# Patient Record
Sex: MALE | URBAN - METROPOLITAN AREA
[De-identification: names, ages, dates, MRNs, and addresses within clinical notes are randomized per-mention and may not be internally consistent; named-entity substitution may affect disease eponyms.]

---

## 2023-06-23 ENCOUNTER — TELEPHONE (OUTPATIENT)
Dept: TRANSPLANT | Facility: CLINIC | Age: 48
End: 2023-06-23

## 2023-06-29 ENCOUNTER — TELEPHONE (OUTPATIENT)
Dept: TRANSPLANT | Facility: CLINIC | Age: 48
End: 2023-06-29
Payer: MEDICARE

## 2023-06-30 ENCOUNTER — TELEPHONE (OUTPATIENT)
Dept: TRANSPLANT | Facility: CLINIC | Age: 48
End: 2023-06-30
Payer: MEDICARE

## 2023-07-12 ENCOUNTER — TELEPHONE (OUTPATIENT)
Dept: TRANSPLANT | Facility: CLINIC | Age: 48
End: 2023-07-12
Payer: MEDICARE

## 2025-01-12 ENCOUNTER — HOSPITAL ENCOUNTER (INPATIENT)
Facility: HOSPITAL | Age: 50
LOS: 7 days | Discharge: HOME | DRG: 542 | End: 2025-01-19
Attending: STUDENT IN AN ORGANIZED HEALTH CARE EDUCATION/TRAINING PROGRAM | Admitting: STUDENT IN AN ORGANIZED HEALTH CARE EDUCATION/TRAINING PROGRAM
Payer: MEDICARE

## 2025-01-12 DIAGNOSIS — A41.9 SEPSIS: ICD-10-CM

## 2025-01-12 DIAGNOSIS — R07.9 CHEST PAIN: ICD-10-CM

## 2025-01-12 DIAGNOSIS — M79.605 LEFT LEG PAIN: Primary | ICD-10-CM

## 2025-01-12 DIAGNOSIS — R00.0 TACHYCARDIA: ICD-10-CM

## 2025-01-12 DIAGNOSIS — R94.31 ABNORMAL EKG: ICD-10-CM

## 2025-01-12 PROCEDURE — 21400001 HC TELEMETRY ROOM

## 2025-01-12 PROCEDURE — 11000001 HC ACUTE MED/SURG PRIVATE ROOM

## 2025-01-12 RX ORDER — IBUPROFEN 200 MG
24 TABLET ORAL
Status: DISCONTINUED | OUTPATIENT
Start: 2025-01-12 | End: 2025-01-19 | Stop reason: HOSPADM

## 2025-01-12 RX ORDER — NIFEDIPINE 90 MG/1
90 TABLET, EXTENDED RELEASE ORAL DAILY
COMMUNITY

## 2025-01-12 RX ORDER — IBUPROFEN 200 MG
16 TABLET ORAL
Status: DISCONTINUED | OUTPATIENT
Start: 2025-01-12 | End: 2025-01-19 | Stop reason: HOSPADM

## 2025-01-12 RX ORDER — ONDANSETRON HYDROCHLORIDE 2 MG/ML
4 INJECTION, SOLUTION INTRAVENOUS EVERY 4 HOURS PRN
Status: DISCONTINUED | OUTPATIENT
Start: 2025-01-12 | End: 2025-01-19 | Stop reason: HOSPADM

## 2025-01-12 RX ORDER — ALUMINUM HYDROXIDE, MAGNESIUM HYDROXIDE, AND SIMETHICONE 1200; 120; 1200 MG/30ML; MG/30ML; MG/30ML
30 SUSPENSION ORAL 4 TIMES DAILY PRN
Status: DISCONTINUED | OUTPATIENT
Start: 2025-01-12 | End: 2025-01-19 | Stop reason: HOSPADM

## 2025-01-12 RX ORDER — TALC
6 POWDER (GRAM) TOPICAL NIGHTLY PRN
Status: DISCONTINUED | OUTPATIENT
Start: 2025-01-12 | End: 2025-01-19 | Stop reason: HOSPADM

## 2025-01-12 RX ORDER — BISACODYL 10 MG/1
10 SUPPOSITORY RECTAL DAILY PRN
Status: DISCONTINUED | OUTPATIENT
Start: 2025-01-12 | End: 2025-01-19 | Stop reason: HOSPADM

## 2025-01-12 RX ORDER — POLYETHYLENE GLYCOL 3350 17 G/17G
17 POWDER, FOR SOLUTION ORAL 2 TIMES DAILY PRN
Status: DISCONTINUED | OUTPATIENT
Start: 2025-01-12 | End: 2025-01-19 | Stop reason: HOSPADM

## 2025-01-12 RX ORDER — SODIUM CHLORIDE 0.9 % (FLUSH) 0.9 %
10 SYRINGE (ML) INJECTION
Status: DISCONTINUED | OUTPATIENT
Start: 2025-01-12 | End: 2025-01-19 | Stop reason: HOSPADM

## 2025-01-12 RX ORDER — GLUCAGON 1 MG
1 KIT INJECTION
Status: DISCONTINUED | OUTPATIENT
Start: 2025-01-12 | End: 2025-01-19 | Stop reason: HOSPADM

## 2025-01-12 RX ORDER — ACETAMINOPHEN 500 MG
1000 TABLET ORAL EVERY 6 HOURS PRN
Status: DISCONTINUED | OUTPATIENT
Start: 2025-01-12 | End: 2025-01-19 | Stop reason: HOSPADM

## 2025-01-12 RX ORDER — ASPIRIN 81 MG/1
81 TABLET ORAL DAILY
COMMUNITY

## 2025-01-12 RX ORDER — MUPIROCIN 20 MG/G
OINTMENT TOPICAL 2 TIMES DAILY
Status: DISPENSED | OUTPATIENT
Start: 2025-01-13 | End: 2025-01-17

## 2025-01-13 PROBLEM — M79.605 LEFT LEG PAIN: Status: ACTIVE | Noted: 2025-01-13

## 2025-01-13 PROBLEM — A41.9 SEPSIS: Status: ACTIVE | Noted: 2025-01-13

## 2025-01-13 LAB
ABS NEUT (OLG): 42.66 X10(3)/MCL (ref 2.1–9.2)
ALBUMIN SERPL-MCNC: 2.2 G/DL (ref 3.5–5)
ALBUMIN/GLOB SERPL: 0.5 RATIO (ref 1.1–2)
ALP SERPL-CCNC: 88 UNIT/L (ref 40–150)
ALT SERPL-CCNC: 15 UNIT/L (ref 0–55)
ANION GAP SERPL CALC-SCNC: 13 MEQ/L
AST SERPL-CCNC: 15 UNIT/L (ref 5–34)
BACTERIA #/AREA URNS AUTO: ABNORMAL /HPF
BILIRUB SERPL-MCNC: 0.4 MG/DL
BILIRUB UR QL STRIP.AUTO: NEGATIVE
BUN SERPL-MCNC: 59.3 MG/DL (ref 8.9–20.6)
CALCIUM SERPL-MCNC: 10.2 MG/DL (ref 8.4–10.2)
CHLORIDE SERPL-SCNC: 103 MMOL/L (ref 98–107)
CLARITY UR: CLEAR
CO2 SERPL-SCNC: 22 MMOL/L (ref 22–29)
COLOR UR AUTO: ABNORMAL
CREAT SERPL-MCNC: 6.92 MG/DL (ref 0.72–1.25)
CREAT/UREA NIT SERPL: 9
ERYTHROCYTE [DISTWIDTH] IN BLOOD BY AUTOMATED COUNT: 12.6 % (ref 11.5–17)
FERRITIN SERPL-MCNC: 5254.22 NG/ML (ref 21.81–274.66)
FLUAV AG UPPER RESP QL IA.RAPID: NOT DETECTED
FLUBV AG UPPER RESP QL IA.RAPID: NOT DETECTED
GFR SERPLBLD CREATININE-BSD FMLA CKD-EPI: 9 ML/MIN/1.73/M2
GLOBULIN SER-MCNC: 4.2 GM/DL (ref 2.4–3.5)
GLUCOSE SERPL-MCNC: 115 MG/DL (ref 74–100)
GLUCOSE UR QL STRIP: NORMAL
HBV SURFACE AG SERPL QL IA: NONREACTIVE
HCT VFR BLD AUTO: 25.2 % (ref 42–52)
HGB BLD-MCNC: 8.4 G/DL (ref 14–18)
HGB UR QL STRIP: ABNORMAL
INSTRUMENT WBC (OLG): 46.88 X10(3)/MCL
IRON SATN MFR SERPL: 11 % (ref 20–50)
IRON SERPL-MCNC: 11 UG/DL (ref 65–175)
KETONES UR QL STRIP: NEGATIVE
LEUKOCYTE ESTERASE UR QL STRIP: NEGATIVE
LYMPHOCYTES NFR BLD MANUAL: 0.94 X10(3)/MCL
LYMPHOCYTES NFR BLD MANUAL: 2 %
MACROCYTES BLD QL SMEAR: ABNORMAL
MAGNESIUM SERPL-MCNC: 1.9 MG/DL (ref 1.6–2.6)
MCH RBC QN AUTO: 33.9 PG (ref 27–31)
MCHC RBC AUTO-ENTMCNC: 33.3 G/DL (ref 33–36)
MCV RBC AUTO: 101.6 FL (ref 80–94)
MONOCYTES NFR BLD MANUAL: 3.28 X10(3)/MCL (ref 0.1–1.3)
MONOCYTES NFR BLD MANUAL: 7 %
NEUTROPHILS NFR BLD MANUAL: 91 %
NITRITE UR QL STRIP: NEGATIVE
NRBC BLD AUTO-RTO: 0 %
OHS QRS DURATION: 98 MS
OHS QTC CALCULATION: 449 MS
PH UR STRIP: 6.5 [PH]
PLATELET # BLD AUTO: 362 X10(3)/MCL (ref 130–400)
PLATELET # BLD EST: NORMAL 10*3/UL
PMV BLD AUTO: 9.7 FL (ref 7.4–10.4)
POIKILOCYTOSIS BLD QL SMEAR: ABNORMAL
POTASSIUM SERPL-SCNC: 3.8 MMOL/L (ref 3.5–5.1)
PROT SERPL-MCNC: 6.4 GM/DL (ref 6.4–8.3)
PROT UR QL STRIP: ABNORMAL
RBC # BLD AUTO: 2.48 X10(6)/MCL (ref 4.7–6.1)
RBC #/AREA URNS AUTO: ABNORMAL /HPF
RBC MORPH BLD: ABNORMAL
SARS-COV-2 RNA RESP QL NAA+PROBE: NOT DETECTED
SODIUM SERPL-SCNC: 138 MMOL/L (ref 136–145)
SP GR UR STRIP.AUTO: 1.01 (ref 1–1.03)
SQUAMOUS #/AREA URNS LPF: ABNORMAL /HPF
TIBC SERPL-MCNC: 86 UG/DL (ref 60–240)
TIBC SERPL-MCNC: 97 UG/DL (ref 250–450)
TRANSFERRIN SERPL-MCNC: 86 MG/DL (ref 174–364)
TROPONIN I SERPL-MCNC: 0.43 NG/ML (ref 0–0.04)
TROPONIN I SERPL-MCNC: 0.65 NG/ML (ref 0–0.04)
TROPONIN I SERPL-MCNC: 0.67 NG/ML (ref 0–0.04)
UROBILINOGEN UR STRIP-ACNC: 2
VANCOMYCIN SERPL-MCNC: 12.2 UG/ML (ref 15–20)
WBC # BLD AUTO: 46.88 X10(3)/MCL (ref 4.5–11.5)
WBC #/AREA URNS AUTO: ABNORMAL /HPF

## 2025-01-13 PROCEDURE — 84484 ASSAY OF TROPONIN QUANT: CPT

## 2025-01-13 PROCEDURE — 21400001 HC TELEMETRY ROOM

## 2025-01-13 PROCEDURE — 85007 BL SMEAR W/DIFF WBC COUNT: CPT

## 2025-01-13 PROCEDURE — A9577 INJ MULTIHANCE: HCPCS | Performed by: INTERNAL MEDICINE

## 2025-01-13 PROCEDURE — 80202 ASSAY OF VANCOMYCIN: CPT

## 2025-01-13 PROCEDURE — 0240U COVID/FLU A&B PCR: CPT | Performed by: NURSE PRACTITIONER

## 2025-01-13 PROCEDURE — 25000003 PHARM REV CODE 250: Performed by: NURSE PRACTITIONER

## 2025-01-13 PROCEDURE — 25000003 PHARM REV CODE 250: Performed by: STUDENT IN AN ORGANIZED HEALTH CARE EDUCATION/TRAINING PROGRAM

## 2025-01-13 PROCEDURE — 83550 IRON BINDING TEST: CPT | Performed by: STUDENT IN AN ORGANIZED HEALTH CARE EDUCATION/TRAINING PROGRAM

## 2025-01-13 PROCEDURE — 63600175 PHARM REV CODE 636 W HCPCS: Performed by: NURSE PRACTITIONER

## 2025-01-13 PROCEDURE — 25500020 PHARM REV CODE 255: Performed by: INTERNAL MEDICINE

## 2025-01-13 PROCEDURE — 93010 ELECTROCARDIOGRAM REPORT: CPT | Mod: ,,, | Performed by: STUDENT IN AN ORGANIZED HEALTH CARE EDUCATION/TRAINING PROGRAM

## 2025-01-13 PROCEDURE — 82728 ASSAY OF FERRITIN: CPT | Performed by: STUDENT IN AN ORGANIZED HEALTH CARE EDUCATION/TRAINING PROGRAM

## 2025-01-13 PROCEDURE — 87340 HEPATITIS B SURFACE AG IA: CPT | Performed by: STUDENT IN AN ORGANIZED HEALTH CARE EDUCATION/TRAINING PROGRAM

## 2025-01-13 PROCEDURE — 63600175 PHARM REV CODE 636 W HCPCS

## 2025-01-13 PROCEDURE — 25000003 PHARM REV CODE 250

## 2025-01-13 PROCEDURE — 81001 URINALYSIS AUTO W/SCOPE: CPT

## 2025-01-13 PROCEDURE — 80053 COMPREHEN METABOLIC PANEL: CPT

## 2025-01-13 PROCEDURE — 86706 HEP B SURFACE ANTIBODY: CPT | Performed by: STUDENT IN AN ORGANIZED HEALTH CARE EDUCATION/TRAINING PROGRAM

## 2025-01-13 PROCEDURE — 93005 ELECTROCARDIOGRAM TRACING: CPT

## 2025-01-13 PROCEDURE — 36415 COLL VENOUS BLD VENIPUNCTURE: CPT

## 2025-01-13 PROCEDURE — 83735 ASSAY OF MAGNESIUM: CPT

## 2025-01-13 RX ORDER — BENZONATATE 200 MG/1
200 CAPSULE ORAL 3 TIMES DAILY PRN
Status: ON HOLD | COMMUNITY
End: 2025-01-15 | Stop reason: HOSPADM

## 2025-01-13 RX ORDER — MORPHINE SULFATE 4 MG/ML
4 INJECTION, SOLUTION INTRAMUSCULAR; INTRAVENOUS EVERY 4 HOURS PRN
Status: DISCONTINUED | OUTPATIENT
Start: 2025-01-13 | End: 2025-01-19 | Stop reason: HOSPADM

## 2025-01-13 RX ORDER — CLONIDINE HYDROCHLORIDE 0.1 MG/1
0.1 TABLET ORAL 2 TIMES DAILY
COMMUNITY

## 2025-01-13 RX ORDER — CINACALCET 90 MG/1
90 TABLET, FILM COATED ORAL
COMMUNITY

## 2025-01-13 RX ORDER — CARVEDILOL 12.5 MG/1
12.5 TABLET ORAL 2 TIMES DAILY WITH MEALS
COMMUNITY

## 2025-01-13 RX ORDER — CHLORPROMAZINE HYDROCHLORIDE 50 MG/1
50 TABLET, FILM COATED ORAL 3 TIMES DAILY
Status: ON HOLD | COMMUNITY
End: 2025-01-15 | Stop reason: HOSPADM

## 2025-01-13 RX ORDER — SUCROFERRIC OXYHYDROXIDE 500 MG/1
500 TABLET, CHEWABLE ORAL 3 TIMES DAILY
COMMUNITY

## 2025-01-13 RX ORDER — NAPROXEN SODIUM 220 MG/1
81 TABLET, FILM COATED ORAL DAILY
Status: DISCONTINUED | OUTPATIENT
Start: 2025-01-13 | End: 2025-01-14

## 2025-01-13 RX ORDER — HYDROCODONE BITARTRATE AND ACETAMINOPHEN 10; 325 MG/1; MG/1
1 TABLET ORAL EVERY 6 HOURS PRN
Status: DISCONTINUED | OUTPATIENT
Start: 2025-01-13 | End: 2025-01-19 | Stop reason: HOSPADM

## 2025-01-13 RX ORDER — HYDRALAZINE HYDROCHLORIDE 20 MG/ML
10 INJECTION INTRAMUSCULAR; INTRAVENOUS
Status: DISCONTINUED | OUTPATIENT
Start: 2025-01-13 | End: 2025-01-19 | Stop reason: HOSPADM

## 2025-01-13 RX ORDER — ATORVASTATIN CALCIUM 40 MG/1
40 TABLET, FILM COATED ORAL NIGHTLY
COMMUNITY

## 2025-01-13 RX ADMIN — ONDANSETRON 4 MG: 2 INJECTION INTRAMUSCULAR; INTRAVENOUS at 05:01

## 2025-01-13 RX ADMIN — VANCOMYCIN HYDROCHLORIDE 1250 MG: 1.25 INJECTION, POWDER, LYOPHILIZED, FOR SOLUTION INTRAVENOUS at 03:01

## 2025-01-13 RX ADMIN — HYDROCODONE BITARTRATE AND ACETAMINOPHEN 1 TABLET: 10; 325 TABLET ORAL at 03:01

## 2025-01-13 RX ADMIN — Medication 6 MG: at 12:01

## 2025-01-13 RX ADMIN — MUPIROCIN: 20 OINTMENT TOPICAL at 12:01

## 2025-01-13 RX ADMIN — GADOBENATE DIMEGLUMINE 17 ML: 529 INJECTION, SOLUTION INTRAVENOUS at 02:01

## 2025-01-13 RX ADMIN — ACETAMINOPHEN 1000 MG: 500 TABLET ORAL at 12:01

## 2025-01-13 RX ADMIN — HYDRALAZINE HYDROCHLORIDE 10 MG: 20 INJECTION INTRAMUSCULAR; INTRAVENOUS at 11:01

## 2025-01-13 RX ADMIN — ACETAMINOPHEN 1000 MG: 500 TABLET ORAL at 07:01

## 2025-01-13 RX ADMIN — ONDANSETRON 4 MG: 2 INJECTION INTRAMUSCULAR; INTRAVENOUS at 08:01

## 2025-01-13 RX ADMIN — PIPERACILLIN SODIUM AND TAZOBACTAM SODIUM 4.5 G: 4; .5 INJECTION, POWDER, LYOPHILIZED, FOR SOLUTION INTRAVENOUS at 03:01

## 2025-01-13 RX ADMIN — HYDROCODONE BITARTRATE AND ACETAMINOPHEN 1 TABLET: 10; 325 TABLET ORAL at 09:01

## 2025-01-13 RX ADMIN — ASPIRIN 81 MG CHEWABLE TABLET 81 MG: 81 TABLET CHEWABLE at 11:01

## 2025-01-13 NOTE — PROGRESS NOTES
Pharmacist Renal Dose Adjustment Note    Mildred Atkins is a 49 y.o. male being treated with the medication zosyn    Patient Data:    Vital Signs (Most Recent):  Temp: 99.5 °F (37.5 °C) (01/13/25 0129)  Pulse: (!) 122 (01/13/25 0054)  BP: (!) 155/92 (01/12/25 2307)  SpO2: 98 % (01/12/25 2307) Vital Signs (72h Range):  Temp:  [99.5 °F (37.5 °C)-102.2 °F (39 °C)]   Pulse:  [122-136]   BP: (155)/(92)   SpO2:  [98 %]      Recent Labs   Lab 01/13/25 0023   CREATININE 6.92*     Serum creatinine: 6.92 mg/dL (H) 01/13/25 0023  Estimated creatinine clearance: 13.9 mL/min (A)    Medication:zosyn dose: 4.5gm frequency q8hrs will be changed to medication:zosyn dose:4.5gm frequency:q12hrs    Pharmacist's Name: Bladimir Estevez  Pharmacist's Extension: [id number]

## 2025-01-13 NOTE — CONSULTS
Inpatient consult to Cardiology  Consult performed by: Debra Russell NP  Consult ordered by: Brittney Gunter FNP  Reason for consult: NSTEMI                [facility] *    Cardiology  Consult Note    Patient Name: Mildred Atkins  MRN: [id number]  Admission Date: 1/12/2025  Hospital Length of Stay: 1 days  Code Status: Full Code   Attending Provider: Reyes, Thairy G, DO   Consulting Provider: Debra Russell NP  Primary Care Physician: Sharmila, Primary Doctor  Principal Problem:<principal problem not specified>    Patient information was obtained from patient, past medical records, and ER records.     Subjective:     Reason for Consult: NSTEMI    HPI: 49-year-old male that is remotely known to [facility]/Dr. Cruz with a PMHx of CAD s/p PCI, ESRD on HD, HTN. He presented to [facility] on 1.12.25 as a transfer from [facility] for higher level of khan for further evaluation for left leg pain. He reports having left leg pain and swelling in his right groin/thigh area. X-ray of the left femur impression reviewed from outside hospital demonstrated no evidence of acute fracture dislocation. Of the left lower extremity done at outside hospital impression reviewed demonstrated extensive soft tissue edema noted, heterogeneous mass versus hematoma noted involving the anterior quadriceps muscle, recommend MRI for further evaluation. VS on admit: HR: 136, /92, RR 20, SpO2 98% on RA, Temp: 102.2F. Lab work shows: WBC: 46.88, H/H 8.4/25.2, , Na 138, K 3.8, BUN/Cr 59.3/6.92, Trop: 0.649 tp 0.669. EKG shows sinus tachycardia with no evidence of acute ischemic changes. [facility] was consulted for NSTEMI.       PMH:  CAD s/p PCI, ESRD on HD, HTN, HLD  PSH: [facility] s/p PCI,   Family History: reviewed and noncontributory   Social History: denies tobacco, ETOH, or illicit drug use     Previous Cardiac Diagnostics:     [facility] (7.15.21):  LM: no-obstructive disease with less than 10% epicardial stenosis '  LAD: eccentric  30-40%disease in the mid-LAD at the level of the large diagonal 1 branch. Diagonal branch there free of any obstructive disease.   LCx: medium caliber obtuse marginal 1 branch with 40% proximal stenosis. Large caliber OM2 branch with mid 20-30% stenosis. True AV groove circumflex coronary artery is a small caliber vessel with diffuse 70-80% stenosis   RCA: Ostial 20% stenosis. Eccentric 80% mid stenosis and distal 70% stenosis. This is the abnormality for his inferior ischemia on the stress test. PLV has diffuse luminal irregularities. PDA gives a superior and inferior branch. Superior branch has diffuse mid 70% disease (small caliber vessel)  LVEDP: 14mmHg    [facility]/SPECT (6.24.21):  This is probably an abnormal perfusion study.   This scan is suggestive of moderate risk for future cardiovascular events.   Medium fixed perfusion abnormality of moderate intensity in the inferior region.  Inferior wall hypokinesis noted on gated imaging.   The left ventricular cavity is noted to be markedly enlarged on the stress study. The left ventricular ejection fraction was calculated to be 37% and left ventricular global function is moderately reduced.   No change with prone imaging is noted.   The study quality is average.     [facility] (5.24.21):  The study quality is excellent.   The left ventricle is normal in size. Global left ventricular systolic function is normal. The left ventricular ejection fraction is 50-55%. The left ventricle diastolic function is impaired (Grade I) with normal left atrial pressure. Severe concentric left ventricular hypertrophy is present.   The left atrial diameter is moderately increased.  Less than mild valvular stenosis/regurgitation present.   The pulmonary artery systolic pressure is 21 mmHg.       Review of patient's allergies indicates:  No Known Allergies  No current facility-administered medications on file prior to encounter.     Current Outpatient Medications on File Prior to Encounter  "  Medication Sig    aspirin (ECOTRIN) 81 MG EC tablet Take 81 mg by mouth once daily.    NIFEdipine (ADALAT CC) 90 MG TbSR Take 90 mg by mouth once daily.     Review of Systems   Constitutional:  Negative for chills and fever.   Respiratory:  Negative for chest tightness, shortness of breath and wheezing.    Cardiovascular:  Negative for chest pain and palpitations.   Gastrointestinal:  Negative for abdominal pain.   Musculoskeletal:         + leg pain   Neurological: Negative.        Objective:     Vital Signs (Most Recent):  Temp: 98.4 °F (36.9 °C) (01/13/25 0746)  Pulse: (!) 123 (01/13/25 0746)  Resp: 18 (01/13/25 0746)  BP: (!) 156/84 (01/13/25 0746)  SpO2: 99 % (01/13/25 0746) Vital Signs (24h Range):  Temp:  [98.4 °F (36.9 °C)-102.2 °F (39 °C)] 98.4 °F (36.9 °C)  Pulse:  [116-136] 123  Resp:  [18-20] 18  SpO2:  [98 %-99 %] 99 %  BP: (155-156)/(84-92) 156/84   Weight: 90.2 kg (198 lb 13.7 oz)  Body mass index is 27.73 kg/m².  SpO2: 99 %     No intake or output data in the 24 hours ending 01/13/25 0747  Lines/Drains/Airways       Peripheral Intravenous Line  Duration                  Peripheral IV - Single Lumen 01/12/25 1200 20 G Anterior;Distal;Left Upper Arm <1 day         Peripheral IV - Single Lumen 01/13/25 0715 22 G Left;Posterior Hand <1 day                  Significant Labs:   Chemistries:   Recent Labs   Lab 01/13/25 0023 01/13/25  0542     --    K 3.8  --      --    CO2 22  --    BUN 59.3*  --    CREATININE 6.92*  --    CALCIUM 10.2  --    BILITOT 0.4  --    ALKPHOS 88  --    ALT 15  --    AST 15  --    GLUCOSE 115*  --    MG 1.90  --    TROPONINI 0.649* 0.669*        CBC/Anemia Labs: Coags:    Recent Labs   Lab 01/13/25 0023   WBC 46.88  46.88*   HGB 8.4*   HCT 25.2*      .6*   RDW 12.6    No results for input(s): "PT", "INR", "APTT" in the last 168 hours.     Significant Imaging:    EKG:       Telemetry:  NSR to ST    Physical Exam  Constitutional:       General: He is " not in acute distress.     Appearance: Normal appearance.   HENT:      Mouth/Throat:      Mouth: Mucous membranes are moist.   Cardiovascular:      Rate and Rhythm: Normal rate and regular rhythm.      Pulses: Normal pulses.      Heart sounds: Normal heart sounds. No murmur heard.  Pulmonary:      Effort: Pulmonary effort is normal. No respiratory distress.      Breath sounds: Normal breath sounds.   Abdominal:      Palpations: Abdomen is soft.   Skin:     General: Skin is warm.   Neurological:      Mental Status: He is alert and oriented to person, place, and time.       Home Medications:   No current facility-administered medications on file prior to encounter.     Current Outpatient Medications on File Prior to Encounter   Medication Sig Dispense Refill    aspirin (ECOTRIN) 81 MG EC tablet Take 81 mg by mouth once daily.      NIFEdipine (ADALAT CC) 90 MG TbSR Take 90 mg by mouth once daily.       Current Schedule Inpatient Medications:   mupirocin   Nasal BID    piperacillin-tazobactam (Zosyn) IV (PEDS and ADULTS) (extended infusion is not appropriate)  4.5 g Intravenous Q12H     Continuous Infusions:    Assessment:   NSTEMI type II s/t Sepsis    - Trop: 0.649 to 0.669   - EKG: no acute ST changes   Sepsis   Leukocytosis / Fever  Acute LLE pain and swelling   Mild anemia   HTN  CAD s/p PCI   - [facility] (7.15.21): PTCA to mid & distal RCA  HLD  ESRD on HD      Plan:   EKG reviewed  No need for acute ischemic evaluation at this time   Abx therapy per hospital medicine   Nephrology consulted for ESRD on HD   Start ASA 81mg   Cardiology will be available, please reconsult if needed      Thank you for your consult.     Debra Russell NP  Cardiology  [facility]    Pt seen and examined by me, Gokul Martinez MD. I have reviewed the NP's note, assessment and plan. I have personally interviewed and examined the patient at bedside and agree with the findings. Medical decision making listed above were done under  my guidance.        Assessment and Plan:  Minimally elevated in a flat troponins in the setting of end-stage renal disease on HD.  No active cardiac complaints and no acute ECG changes. No additional cardiac recommendation.  Low suspicion for cardiac etiologies   Will be available if needed

## 2025-01-13 NOTE — H&P
[facility] Hospital Medicine History & Physical Examination       Patient Name: Mildred Atkins  MRN: [id number]  Patient Class: IP- Inpatient   Admission Date: 1/12/2025   Admitting Physician: [facility]   Length of Stay: 1  Attending Physician: Dr Devin Matos  Primary Care Provider: non-staff  Face-to-Face encounter date: 01/13/2025  Code Status: Full code  Chief Complaint: left leg pain      Screening for Social Drivers for health:  Patient screened for food insecurity, housing instability, transportation needs, utility difficulties, and interpersonal safety (select all that apply as identified as concern)  []Housing or Food  []Transportation Needs  []Utility Difficulties  []Interpersonal safety  [x]None    Patient information was obtained from patient, patient's family, past medical records and/or ER records.     HISTORY OF PRESENT ILLNESS:   Mildred Atkins is a 49 y.o. male who PMH Includes HTN, CAD, ESRD with HD, anemia, hyperparathyroidism; presents to the ED at [facility] on 1/12/2025 transferred from [facility] for higher level of care and further evaluation of left leg pain complaints.  Patient reports he has been having left leg pain off and on over the past  2 week progressively getting worse. Pt reports he started to have swelling which started in his right groin/thigh area.  [facility] need to follow-up with his PCP.  Patient reports the pain and swelling worsened thus he presented to [facility] ED again.  X-ray of the left femur impression reviewed from outside hospital demonstrated no evidence of acute fracture dislocation.  Of the left lower extremity done at outside hospital impression reviewed demonstrated extensive soft tissue edema noted, heterogeneous mass versus hematoma noted involving the anterior quadriceps muscle, recommend MRI for further evaluation.  Chest x-ray done at outside hospital impression  reviewed demonstrated no evidence of acute cardiopulmonary process radiographically.    Initial vital signs /92 pulse 136 respirations 20 temperature 102.2° F O2 saturation 98% on room air.  Patient did have blood cultures collected at OSH.  Patient was started on IV vancomycin and Zosyn therapy.  At the time of rounds patient's heart rate had trended down to 110's. Pt denies any CP, SOB, N/V/D.   Cardiology Services have been consulted.    Renal Services have been consulted.    PAST MEDICAL HISTORY:   HTN   HLD   ESRD with HD   Anemia   Medical noncompliance    PAST SURGICAL HISTORY:   Dialysis access placement    ALLERGIES:   Patient has no known allergies.    FAMILY HISTORY:   Reviewed and negative    SOCIAL HISTORY:   No reports of alcohol use   No reports of illicit drug use   No reports of tobacco use   Lives with family    HOME MEDICATIONS:   As documented  Prior to Admission medications    Medication Sig Start Date End Date Taking? Authorizing Provider   aspirin (ECOTRIN) 81 MG EC tablet Take 81 mg by mouth once daily.   Yes Provider, Historical   NIFEdipine (ADALAT CC) 90 MG TbSR Take 90 mg by mouth once daily.   Yes Provider, Historical       REVIEW OF SYSTEMS:   Except as documented, all other systems reviewed and negative     PHYSICAL EXAM:     VITAL SIGNS: 24 HRS MIN & MAX LAST   Temp  Min: 98.4 °F (36.9 °C)  Max: 102.2 °F (39 °C) 98.4 °F (36.9 °C)   BP  Min: 155/92  Max: 156/84 (!) 156/84   Pulse  Min: 115  Max: 136  (!) 115   Resp  Min: 18  Max: 20 18   SpO2  Min: 98 %  Max: 99 % 99 %       General appearance: Well-developed, well-nourished male in no apparent distress.  HENT: Atraumatic head. Moist mucous membranes of oral cavity.  Eyes: PERRL  Lungs: Clear to auscultation bilaterally. No wheezing present.   Heart: Tachycardia  regular rhythm. S1 and S2 present, cap refill brisk, dialysis access with palpable thrill and audible bruit  Abdomen: Soft, non-distended, non-tender. Bowel sounds  are normal.   Extremities: No cyanosis, clubbing, left thigh area with swelling ant TTP  See media    Skin: warm and dry  Neuro: oriented x 3 no acute focal deficits  Psych/mental status: flat affect, cooperative    LABS AND IMAGING:     Recent Labs   Lab 01/13/25  0023   WBC 46.88  46.88*   RBC 2.48*   HGB 8.4*   HCT 25.2*   .6*   MCH 33.9*   MCHC 33.3   RDW 12.6      MPV 9.7       Recent Labs   Lab 01/13/25  0023      K 3.8      CO2 22   BUN 59.3*   CREATININE 6.92*   CALCIUM 10.2   MG 1.90   ALBUMIN 2.2*   ALKPHOS 88   ALT 15   AST 15   BILITOT 0.4     SEE RECORDS FROM TRANSFERRING FACILITY      ASSESSMENT & PLAN:   ASSESSMENT:  Acute left leg pain and swelling- POA  Sepsis-  unknown etiology- POA  Leukocytosis- secondary to sepsis- POA  NSTEMI- suspected secondary to sepsis- POA  Fever- suspected secondary to sepsis- POA  Anemia- chronic disease- POA  HTN- urgency- POA  ESRD with HD- POA  Elevated troponin- POA  Weakness- POA      PLAN:  Cardiology Services consulted appreciate assistance and recommendations   Renal Services assaulted appreciate assistance recommendations  Fall precautions   Neurovascular checks to left lower extremity   Monitor H&H   Accurate I&O   Daily weights   Resume home medication as deemed necessary   PRN pain management   Follow cultures and sensitivities   Continue with IV antibiotic therapy   Repeat lab work in a.m.      VTE Prophylaxis: SCD for DVT prophylaxis and will be advised to be as mobile as possible and sit in a chair as tolerated    Patient condition:  Stable  __________________________________________________________________________  INPATIENT LIST OF MEDICATIONS     Scheduled Meds:   mupirocin   Nasal BID    piperacillin-tazobactam (Zosyn) IV (PEDS and ADULTS) (extended infusion is not appropriate)  4.5 g Intravenous Q12H     Continuous Infusions:  PRN Meds:.  Current Facility-Administered Medications:     acetaminophen, 1,000 mg, Oral, Q6H PRN     aluminum-magnesium hydroxide-simethicone, 30 mL, Oral, QID PRN    bisacodyL, 10 mg, Rectal, Daily PRN    dextrose 50%, 12.5 g, Intravenous, PRN    dextrose 50%, 25 g, Intravenous, PRN    glucagon (human recombinant), 1 mg, Intramuscular, PRN    glucose, 16 g, Oral, PRN    glucose, 24 g, Oral, PRN    HYDROcodone-acetaminophen, 1 tablet, Oral, Q6H PRN    melatonin, 6 mg, Oral, Nightly PRN    morphine, 4 mg, Intravenous, Q4H PRN    ondansetron, 4 mg, Intravenous, Q4H PRN    polyethylene glycol, 17 g, Oral, BID PRN    sodium chloride 0.9%, 10 mL, Intravenous, PRN    vancomycin - pharmacy to dose, , Intravenous, pharmacy to manage frequency      I, ROHINI Koroma have reviewed and discussed the case with   Dr. Devin Matos.  Please see the following addendum for further assessment and plan from their attending MD.  ROHINI Watson   01/13/2025    ________________________________________________________________________________    MD Addendum:  I, Dr. Knox assumed care of this patient today   For the patient encounter, I performed the substantive portion of the visit, I reviewed the NP/PA documentation, treatment plan, and medical decision making.  I had face to face time with this patient     A. History:  Patient with ESRD came in with c/o right thigh swelling past 2 weeks  Progressively getting worse and now is painful.   Denies any trauma to the legs.   + fever max 102F    B. Physical exam:  Heart RRR  Lungs clear   Abdomen soft and non tender   Neuro: No FND    Right thigh + firm swelling with tenderness     C. Medical decision making:  Patients labs and vitals reviewed  He does have SIRS criteria, will continue iv abx for today   WBC 46k, will get a peripheral smear  Will get blood cultures     Swelling is firm and worrisome for sarcoma  Will f/u on MRI final report   Depending on the report will get Gen surgery or ortho consult     Continue HD per renal team     Pain controlled  with po prn norco     He is anemic, will closely monitor     Troponin elevated, seen by [facility] and no acute intervention recommended     Labs in am     Discharge Planning and Disposition: No mobility needs. Ambulating well. Good social support system.   Anticipated discharge    All diagnosis and differential diagnosis have been reviewed; assessment and plan has been documented; I have personally reviewed the labs and test results that are presently available; I have reviewed the patients medication list; I have reviewed the consulting providers response and recommendations. I have reviewed or attempted to review medical records based upon their availability.    All of the patient and family questions have been addressed and answered. Patient's is agreeable to the above stated plan. I will continue to monitor closely and make adjustments to medical management as needed.

## 2025-01-13 NOTE — PROGRESS NOTES
Pharmacokinetic Initial Assessment: IV Vancomycin    Assessment/Plan:    Patient received 1gm at 1520 on 01/12 at [facility].  Random Level was ordered and resulted at 12.2 at 0023 on 01/13.  Initiate intravenous vancomycin with loading dose of 1250 mg once with subsequent doses when random concentrations are less than 20 mcg/mL  Desired empiric serum trough concentration is 15 to 20 mcg/mL  Draw vancomycin random level on 01/14 at 0430.  Pharmacy will continue to follow and monitor vancomycin.      Please contact pharmacy at extension [phone] with any questions regarding this assessment.     Thank you for the consult,   Bladimir Estevez       Patient brief summary:  Mildred Atkins is a 49 y.o. male initiated on antimicrobial therapy with IV Vancomycin for treatment of suspected skin & soft tissue infection    Drug Allergies:   Review of patient's allergies indicates:  No Known Allergies    Actual Body Weight:   90.2kg    Renal Function:   Estimated Creatinine Clearance: 13.9 mL/min (A) (based on SCr of 6.92 mg/dL (H)).,     Dialysis Method (if applicable):  intermittent HD    CBC (last 72 hours):  Recent Labs   Lab Result Units 01/13/25  0023   WBC x10(3)/mcL 46.88  46.88*   Hgb g/dL 8.4*   Hct % 25.2*   Platelet x10(3)/mcL 362   Monocytes % % 7       Metabolic Panel (last 72 hours):  Recent Labs   Lab Result Units 01/13/25  0023 01/13/25  0037   Sodium mmol/L 138  --    Potassium mmol/L 3.8  --    Chloride mmol/L 103  --    CO2 mmol/L 22  --    Glucose mg/dL 115*  --    Glucose, UA   --  Normal   Blood Urea Nitrogen mg/dL 59.3*  --    Creatinine mg/dL 6.92*  --    Albumin g/dL 2.2*  --    Bilirubin Total mg/dL 0.4  --    ALP unit/L 88  --    AST unit/L 15  --    ALT unit/L 15  --    Magnesium Level mg/dL 1.90  --        Drug levels (last 3 results):  Recent Labs   Lab Result Units 01/13/25  0023   Vancomycin Random ug/ml 12.2*       Microbiologic Results:  Microbiology Results (last 7 days)       ** No  results found for the last 168 hours. **

## 2025-01-13 NOTE — PLAN OF CARE
01/13/25 1554   Discharge Assessment   Assessment Type Discharge Planning Assessment   Confirmed/corrected address, phone number and insurance Yes   Source of Information patient   When was your last doctors appointment?   (No PCP)   Reason For Admission Left Leg Pain   People in Home parent(s)   Do you expect to return to your current living situation? Yes   Do you have help at home or someone to help you manage your care at home? Yes   Current cognitive status: Alert/Oriented   Walking or Climbing Stairs Difficulty no   Dressing/Bathing Difficulty no   Home Accessibility wheelchair accessible   Home Layout Able to live on 1st floor   Equipment Currently Used at Home none   Readmission within 30 days? No   Do you currently have service(s) that help you manage your care at home? No   Do you take prescription medications? Yes   Do you have prescription coverage? Yes   Do you have any problems affording any of your prescribed medications? No   How do you get to doctors appointments? health plan transportation;family or friend will provide   Are you on dialysis? Yes   Dialysis Name and Scheduled days [facility] [email or web address]. Medical Transportation transports.   Do you take coumadin? No   Discharge Plan A Home   Discharge Plan B Home   Discharge Plan discussed with: Patient

## 2025-01-13 NOTE — CONSULTS
Nephrology Initial Consult Note    Patient Name: Mildred Atkins  Age: 49 y.o.  : 1975  MRN: [id number]  Admission Date: 2025    Reason for Consult:      ESRD  Tressa Snyder MD    HPI:     Mildred Atkins is a 49 y.o. male Who presents with left leg pain and swelling. PMH significant for ESRD on HD TTS at [facility], hypertension, coronary artery disease, anemia, and secondary hyperparathyroidism.  Patient presented as a transfer from outside hospital due to left leg pain and swelling.  He has been having left leg pain and swelling for about 2 weeks.  After admission he has started on antibiotics for presumed sepsis.  Patient also has severe leukocytosis.  Nephrology consulted for ESRD management.  He denies chest pain, shortness of breath, nausea, vomiting.        Current Facility-Administered Medications   Medication Dose Route Frequency Provider Last Rate Last Admin    acetaminophen tablet 1,000 mg  1,000 mg Oral Q6H PRN Lyric, Brittney, FNP   1,000 mg at 25 0709    aluminum-magnesium hydroxide-simethicone 200-200-20 mg/5 mL suspension 30 mL  30 mL Oral QID PRN Lyric Brittney, FNP        aspirin chewable tablet 81 mg  81 mg Oral Daily Drew, Debra, NP   81 mg at 25 1132    bisacodyL suppository 10 mg  10 mg Rectal Daily PRN Lyric Brittney, FNP        dextrose 50% injection 12.5 g  12.5 g Intravenous PRN Lyric Brittney, FNP        dextrose 50% injection 25 g  25 g Intravenous PRN Lyric Brittney, FNP        glucagon (human recombinant) injection 1 mg  1 mg Intramuscular PRN Lyric, Brittney, FNP        glucose chewable tablet 16 g  16 g Oral PRN Lyric, Brittney, FNP        glucose chewable tablet 24 g  24 g Oral PRN Charles Gunteryssa, FNP        hydrALAZINE injection 10 mg  10 mg Intravenous Q2H PRN Pricila Ellis, FNP   10 mg at 25 1152    HYDROcodone-acetaminophen  mg per tablet 1 tablet  1 tablet Oral Q6H PRN Pricila Ellis, FNP   1 tablet at 25 0954     melatonin tablet 6 mg  6 mg Oral Nightly PRN Brittney Gunter FNP   6 mg at 01/13/25 0043    morphine injection 4 mg  4 mg Intravenous Q4H PRN Pricila Ellis FNP        mupirocin 2 % ointment   Nasal BID Reyes, Thairy G, DO   Given at 01/13/25 0006    ondansetron injection 4 mg  4 mg Intravenous Q4H PRN Brittney Gunter FNP   4 mg at 01/13/25 0842    piperacillin-tazobactam (ZOSYN) 4.5 g in D5W 100 mL IVPB (MB+)  4.5 g Intravenous Q12H Brittney Gunter FNP   Stopped at 01/13/25 0739    polyethylene glycol packet 17 g  17 g Oral BID PRN Brittney Gunter FNP        sodium chloride 0.9% flush 10 mL  10 mL Intravenous PRN Brittney Gunter FNP        vancomycin - pharmacy to dose   Intravenous pharmacy to manage frequency Brittney Gunter FNP           No, Primary Doctor    No past medical history on file.   No past surgical history on file.   No family history on file.  Social History     Tobacco Use    Smoking status: Not on file    Smokeless tobacco: Not on file   Substance Use Topics    Alcohol use: Not on file     Medications Prior to Admission   Medication Sig Dispense Refill Last Dose/Taking    aspirin (ECOTRIN) 81 MG EC tablet Take 81 mg by mouth once daily.   1/11/2025 at  9:00 AM    NIFEdipine (ADALAT CC) 90 MG TbSR Take 90 mg by mouth once daily.   1/11/2025 at  9:00 AM    atorvastatin (LIPITOR) 40 MG tablet Take 40 mg by mouth every evening. Patient states has not filled prescription from pharmacy       benzonatate (TESSALON) 200 MG capsule Take 200 mg by mouth 3 (three) times daily as needed for Cough.       carvediloL (COREG) 12.5 MG tablet Take 12.5 mg by mouth 2 (two) times daily with meals. Supposed to be taking, claims has not picked up medication from pharmacy       chlorproMAZINE (THORAZINE) 50 MG tablet Take 50 mg by mouth 3 (three) times daily. Has not picked up from pharmacy       cinacalcet (SENSIPAR) 90 MG Tab Take 90 mg by mouth daily with breakfast. Has not picked up from pharmacy        cloNIDine (CATAPRES) 0.1 MG tablet Take 0.1 mg by mouth 2 (two) times daily. Has not picked up from pharmacy       sucroferric oxyhydroxide (VELPHORO) 500 mg Chew Take 500 mg by mouth 3 (three) times daily.        Review of patient's allergies indicates:  No Known Allergies         Review of Systems:     Comprehensive 10pt ROS negative except as noted per history.      Objective:       VITAL SIGNS: 24 HR MIN & MAX LAST    Temp  Min: 97.8 °F (36.6 °C)  Max: 102.2 °F (39 °C)  97.8 °F (36.6 °C)        BP  Min: 155/92  Max: 191/103  (!) 191/103     Pulse  Min: 111  Max: 136  (!) 111     Resp  Min: 18  Max: 20  18    SpO2  Min: 98 %  Max: 99 %  98 %      GEN: Well appearing AA male in NAD  HEENT: Conjunctiva anicteric, pupils equal   CV: RRR +S1,S2 without murmur  PULM: CTAB, unlabored  ABD: Soft, NT/ND abdomen with NABS  EXT:  Left lower extremity swelling around thigh. No edema  SKIN: Warm and dry  PSYCH: Awake, alert and appropriately conversant.   Dialysis access:  RUE AVF with good pulsation and thrill            Component Value Date/Time     01/13/2025 0023     09/24/2021 1425     08/20/2021 0646    K 3.8 01/13/2025 0023    K 3.4 (L) 09/24/2021 1425    K 3.3 (L) 08/20/2021 0646    CO2 22 01/13/2025 0023    CO2 34 (H) 09/24/2021 1425    CO2 31 08/20/2021 0646    BUN 59.3 (H) 01/13/2025 0023    BUN 44 (H) 09/24/2021 1425    BUN 45 (H) 08/20/2021 0646    CREATININE 6.92 (H) 01/13/2025 0023    CREATININE 6.22 (H) 09/24/2021 1425    CREATININE 6.81 (H) 08/20/2021 0646    CALCIUM 10.2 01/13/2025 0023    CALCIUM 9.7 05/21/2024 0000    CALCIUM 9.5 09/24/2021 1425            Component Value Date/Time    WBC 46.88 (H) 01/13/2025 0023    WBC 46.88 01/13/2025 0023    HGB 8.4 (L) 01/13/2025 0023    HGB 11.2 (L) 12/30/2024 0000    HGB 11.6 (L) 12/23/2024 0000    HCT 25.2 (L) 01/13/2025 0023     01/13/2025 0023             MRI Femur W WO Contrast Left    (Results Pending)       Assessment / Plan:        Active Hospital Problems    Diagnosis  POA    *Left leg pain [M79.605]  Unknown    Sepsis [A41.9]  Unknown      Resolved Hospital Problems   No resolved problems to display.       ESRD - [place] [facility]  Sepsis  Leukocytosis  Anemia  Hypertension    Plan:  No acute indication for off schedule hemodialysis today.  We will plan to dialyze tomorrow on regular TTS schedule.  Okay to get MRI with contrast if needed. No need to dialyze afterwards.  Check iron studies for anemia.    Darron Green DO  Nephrology  [facility]  Clinic number: [phone]      Note was created with the assistance of electronic Dictation Services.  Note was reviewed to decrease errors, however, these may still be present.  Please contact me about questions or concerns with the dictation.

## 2025-01-13 NOTE — NURSING
Report was called from [facility] by ED RN, Aamir. Patient is currently awaiting transport via EMS. Will continue to monitor.

## 2025-01-14 LAB
ALBUMIN SERPL-MCNC: 2.2 G/DL (ref 3.5–5)
ALBUMIN/GLOB SERPL: 0.6 RATIO (ref 1.1–2)
ALP SERPL-CCNC: 88 UNIT/L (ref 40–150)
ALT SERPL-CCNC: 15 UNIT/L (ref 0–55)
ANION GAP SERPL CALC-SCNC: 15 MEQ/L
AST SERPL-CCNC: 14 UNIT/L (ref 5–34)
BASOPHILS # BLD AUTO: 0.14 X10(3)/MCL
BASOPHILS NFR BLD AUTO: 0.3 %
BILIRUB SERPL-MCNC: 0.4 MG/DL
BUN SERPL-MCNC: 80.6 MG/DL (ref 8.9–20.6)
CALCIUM SERPL-MCNC: 10.1 MG/DL (ref 8.4–10.2)
CHLORIDE SERPL-SCNC: 98 MMOL/L (ref 98–107)
CO2 SERPL-SCNC: 21 MMOL/L (ref 22–29)
CREAT SERPL-MCNC: 9.33 MG/DL (ref 0.72–1.25)
CREAT/UREA NIT SERPL: 9
EOSINOPHIL # BLD AUTO: 0.06 X10(3)/MCL (ref 0–0.9)
EOSINOPHIL NFR BLD AUTO: 0.1 %
ERYTHROCYTE [DISTWIDTH] IN BLOOD BY AUTOMATED COUNT: 12.7 % (ref 11.5–17)
GFR SERPLBLD CREATININE-BSD FMLA CKD-EPI: 6 ML/MIN/1.73/M2
GLOBULIN SER-MCNC: 3.4 GM/DL (ref 2.4–3.5)
GLUCOSE SERPL-MCNC: 108 MG/DL (ref 74–100)
HCT VFR BLD AUTO: 23.6 % (ref 42–52)
HEMATOLOGIST REVIEW: NORMAL
HGB BLD-MCNC: 7.6 G/DL (ref 14–18)
IMM GRANULOCYTES # BLD AUTO: 1.13 X10(3)/MCL (ref 0–0.04)
IMM GRANULOCYTES NFR BLD AUTO: 2.5 %
LYMPHOCYTES # BLD AUTO: 1.29 X10(3)/MCL (ref 0.6–4.6)
LYMPHOCYTES NFR BLD AUTO: 2.8 %
MCH RBC QN AUTO: 33.3 PG (ref 27–31)
MCHC RBC AUTO-ENTMCNC: 32.2 G/DL (ref 33–36)
MCV RBC AUTO: 103.5 FL (ref 80–94)
MONOCYTES # BLD AUTO: 2.54 X10(3)/MCL (ref 0.1–1.3)
MONOCYTES NFR BLD AUTO: 5.5 %
NEUTROPHILS # BLD AUTO: 40.7 X10(3)/MCL (ref 2.1–9.2)
NEUTROPHILS NFR BLD AUTO: 88.8 %
NRBC BLD AUTO-RTO: 0 %
PLATELET # BLD AUTO: 376 X10(3)/MCL (ref 130–400)
PMV BLD AUTO: 9.9 FL (ref 7.4–10.4)
POTASSIUM SERPL-SCNC: 4.1 MMOL/L (ref 3.5–5.1)
PROT SERPL-MCNC: 5.6 GM/DL (ref 6.4–8.3)
RBC # BLD AUTO: 2.28 X10(6)/MCL (ref 4.7–6.1)
SODIUM SERPL-SCNC: 134 MMOL/L (ref 136–145)
VANCOMYCIN SERPL-MCNC: 20.1 UG/ML (ref 15–20)
WBC # BLD AUTO: 45.86 X10(3)/MCL (ref 4.5–11.5)

## 2025-01-14 PROCEDURE — 93005 ELECTROCARDIOGRAM TRACING: CPT

## 2025-01-14 PROCEDURE — 85025 COMPLETE CBC W/AUTO DIFF WBC: CPT | Performed by: INTERNAL MEDICINE

## 2025-01-14 PROCEDURE — 36415 COLL VENOUS BLD VENIPUNCTURE: CPT | Performed by: INTERNAL MEDICINE

## 2025-01-14 PROCEDURE — 36415 COLL VENOUS BLD VENIPUNCTURE: CPT

## 2025-01-14 PROCEDURE — 25000003 PHARM REV CODE 250: Performed by: STUDENT IN AN ORGANIZED HEALTH CARE EDUCATION/TRAINING PROGRAM

## 2025-01-14 PROCEDURE — 93010 ELECTROCARDIOGRAM REPORT: CPT | Mod: ,,, | Performed by: INTERNAL MEDICINE

## 2025-01-14 PROCEDURE — 25000003 PHARM REV CODE 250

## 2025-01-14 PROCEDURE — 80053 COMPREHEN METABOLIC PANEL: CPT

## 2025-01-14 PROCEDURE — 25000003 PHARM REV CODE 250: Performed by: INTERNAL MEDICINE

## 2025-01-14 PROCEDURE — 93010 ELECTROCARDIOGRAM REPORT: CPT | Mod: 76,,, | Performed by: INTERNAL MEDICINE

## 2025-01-14 PROCEDURE — 21400001 HC TELEMETRY ROOM

## 2025-01-14 PROCEDURE — 25000003 PHARM REV CODE 250: Performed by: NURSE PRACTITIONER

## 2025-01-14 PROCEDURE — 87040 BLOOD CULTURE FOR BACTERIA: CPT | Performed by: INTERNAL MEDICINE

## 2025-01-14 PROCEDURE — 80202 ASSAY OF VANCOMYCIN: CPT

## 2025-01-14 PROCEDURE — 63600175 PHARM REV CODE 636 W HCPCS

## 2025-01-14 RX ORDER — CINACALCET 30 MG/1
90 TABLET, FILM COATED ORAL
Status: DISCONTINUED | OUTPATIENT
Start: 2025-01-15 | End: 2025-01-19 | Stop reason: HOSPADM

## 2025-01-14 RX ORDER — NIFEDIPINE 90 MG/1
90 TABLET, EXTENDED RELEASE ORAL DAILY
Status: DISCONTINUED | OUTPATIENT
Start: 2025-01-14 | End: 2025-01-19 | Stop reason: HOSPADM

## 2025-01-14 RX ORDER — CARVEDILOL 12.5 MG/1
12.5 TABLET ORAL 2 TIMES DAILY WITH MEALS
Status: DISCONTINUED | OUTPATIENT
Start: 2025-01-14 | End: 2025-01-19 | Stop reason: HOSPADM

## 2025-01-14 RX ORDER — CLONIDINE HYDROCHLORIDE 0.1 MG/1
0.1 TABLET ORAL 2 TIMES DAILY
Status: DISCONTINUED | OUTPATIENT
Start: 2025-01-14 | End: 2025-01-19 | Stop reason: HOSPADM

## 2025-01-14 RX ORDER — ASPIRIN 81 MG/1
81 TABLET ORAL DAILY
Status: DISCONTINUED | OUTPATIENT
Start: 2025-01-15 | End: 2025-01-19 | Stop reason: HOSPADM

## 2025-01-14 RX ORDER — CHLORPROMAZINE HYDROCHLORIDE 25 MG/1
50 TABLET, FILM COATED ORAL ONCE
Status: COMPLETED | OUTPATIENT
Start: 2025-01-14 | End: 2025-01-14

## 2025-01-14 RX ORDER — CHLORPROMAZINE HYDROCHLORIDE 25 MG/1
50 TABLET, FILM COATED ORAL ONCE
Status: DISCONTINUED | OUTPATIENT
Start: 2025-01-14 | End: 2025-01-14

## 2025-01-14 RX ADMIN — Medication 6 MG: at 07:01

## 2025-01-14 RX ADMIN — HYDROCODONE BITARTRATE AND ACETAMINOPHEN 1 TABLET: 10; 325 TABLET ORAL at 04:01

## 2025-01-14 RX ADMIN — CHLORPROMAZINE HYDROCHLORIDE 50 MG: 25 TABLET, FILM COATED ORAL at 09:01

## 2025-01-14 RX ADMIN — PIPERACILLIN SODIUM AND TAZOBACTAM SODIUM 4.5 G: 4; .5 INJECTION, POWDER, LYOPHILIZED, FOR SOLUTION INTRAVENOUS at 05:01

## 2025-01-14 RX ADMIN — PIPERACILLIN SODIUM AND TAZOBACTAM SODIUM 4.5 G: 4; .5 INJECTION, POWDER, LYOPHILIZED, FOR SOLUTION INTRAVENOUS at 04:01

## 2025-01-14 RX ADMIN — CARVEDILOL 12.5 MG: 12.5 TABLET, FILM COATED ORAL at 05:01

## 2025-01-14 RX ADMIN — ASPIRIN 81 MG CHEWABLE TABLET 81 MG: 81 TABLET CHEWABLE at 09:01

## 2025-01-14 RX ADMIN — HYDROCODONE BITARTRATE AND ACETAMINOPHEN 1 TABLET: 10; 325 TABLET ORAL at 11:01

## 2025-01-14 RX ADMIN — HYDROCODONE BITARTRATE AND ACETAMINOPHEN 1 TABLET: 10; 325 TABLET ORAL at 10:01

## 2025-01-14 RX ADMIN — MUPIROCIN: 20 OINTMENT TOPICAL at 07:01

## 2025-01-14 RX ADMIN — MUPIROCIN: 20 OINTMENT TOPICAL at 09:01

## 2025-01-14 RX ADMIN — NIFEDIPINE 90 MG: 90 TABLET, FILM COATED, EXTENDED RELEASE ORAL at 05:01

## 2025-01-14 RX ADMIN — CLONIDINE HYDROCHLORIDE 0.1 MG: 0.1 TABLET ORAL at 07:01

## 2025-01-14 NOTE — PLAN OF CARE
Problem: Adult Inpatient Plan of Care  Goal: Plan of Care Review  Outcome: Progressing  Goal: Patient-Specific Goal (Individualized)  Outcome: Progressing  Goal: Absence of Hospital-Acquired Illness or Injury  Outcome: Progressing  Goal: Optimal Comfort and Wellbeing  Outcome: Progressing  Goal: Readiness for Transition of Care  Outcome: Progressing     Problem: Sepsis/Septic Shock  Goal: Optimal Coping  Outcome: Progressing  Goal: Absence of Bleeding  Outcome: Progressing  Goal: Blood Glucose Level Within Targeted Range  Outcome: Progressing  Goal: Absence of Infection Signs and Symptoms  Outcome: Progressing  Goal: Optimal Nutrition Intake  Outcome: Progressing     Problem: Hemodialysis  Goal: Safe, Effective Therapy Delivery  Outcome: Progressing  Goal: Effective Tissue Perfusion  Outcome: Progressing  Goal: Absence of Infection Signs and Symptoms  Outcome: Progressing

## 2025-01-14 NOTE — PROGRESS NOTES
Pharmacokinetic Assessment Follow Up: IV Vancomycin    Vancomycin serum concentration assessment(s):    The random level was drawn correctly and can be used to guide therapy at this time. The measurement is above the desired definitive target range of 10 to 20 mcg/mL.    Vancomycin Regimen Plan:    Re-dose when the random level is less than 20 mcg/mL, next level to be drawn at 0430 on 01/16.    Drug levels (last 3 results):  Recent Labs   Lab Result Units 01/13/25  0023 01/14/25  0658   Vancomycin Random ug/ml 12.2* 20.1*       Pharmacy will continue to follow and monitor vancomycin.    Please contact pharmacy at extension [phone] for questions regarding this assessment.    Thank you for the consult,   Franklyn Gonsales       Patient brief summary:  Mildred Atkins is a 49 y.o. male initiated on antimicrobial therapy with IV Vancomycin for treatment of skin & soft tissue infection    The patient's current regimen is pulse dose.    Drug Allergies:   Review of patient's allergies indicates:  No Known Allergies    Actual Body Weight:   90.2kg    Renal Function:   Estimated Creatinine Clearance: 10.3 mL/min (A) (based on SCr of 9.33 mg/dL (H)).,     Dialysis Method (if applicable):  intermittent HD    CBC (last 72 hours):  Recent Labs   Lab Result Units 01/13/25  0023 01/14/25  0658   WBC x10(3)/mcL 46.88  46.88* 45.86*   Hgb g/dL 8.4* 7.6*   Hct % 25.2* 23.6*   Platelet x10(3)/mcL 362 376   Mono % %  --  5.5   Monocytes % % 7  --    Eos % %  --  0.1   Basophil % %  --  0.3       Metabolic Panel (last 72 hours):  Recent Labs   Lab Result Units 01/13/25  0023 01/13/25  0037 01/14/25  0658   Sodium mmol/L 138  --  134*   Potassium mmol/L 3.8  --  4.1   Chloride mmol/L 103  --  98   CO2 mmol/L 22  --  21*   Glucose mg/dL 115*  --  108*   Glucose, UA   --  Normal  --    Blood Urea Nitrogen mg/dL 59.3*  --  80.6*   Creatinine mg/dL 6.92*  --  9.33*   Albumin g/dL 2.2*  --  2.2*   Bilirubin Total mg/dL 0.4  --  0.4   ALP unit/L 88  --   88   AST unit/L 15  --  14   ALT unit/L 15  --  15   Magnesium Level mg/dL 1.90  --   --        Vancomycin Administrations:  vancomycin given in the last 96 hours                     vancomycin 1,250 mg in D5W 250 mL IVPB (admixture device) (mg) 1,250 mg New Bag 01/13/25 0341                    Microbiologic Results:  Microbiology Results (last 7 days)       ** No results found for the last 168 hours. **

## 2025-01-14 NOTE — NURSING
01/14/25 1729   Post-Hemodialysis Assessment   Blood Volume Processed (Liters) 72 L   Dialyzer Clearance Clear   Duration of Treatment 180 minutes   Additional Fluid Intake (mL) 0 mL   Total UF (mL) 2000 mL   Post-Hemodialysis Comments Tx completed as ordered. VSS throughout and post tx. Net 2L fluid removed. No bleeding at access site on departure.

## 2025-01-14 NOTE — PROGRESS NOTES
[facility] Hospital Medicine Progress Note        Chief Complaint: Inpatient Follow-up for right thigh swelling     HPI:   Mildred Atkins is a 49 y.o. male who PMH Includes HTN, CAD, ESRD with HD, anemia, hyperparathyroidism; presents to the ED at [facility] on 1/12/2025 [place] for higher level of care and further evaluation of left leg pain complaints.  Patient reports he has been having left leg pain off and on over the past  2 week progressively getting worse. Pt reports he started to have swelling which started in his right groin/thigh area.  Patient reports he was seen at [facility] ED for this with need to follow-up with his PCP.  Patient reports the pain and swelling worsened thus he presented to [facility] ED again.  X-ray of the left femur impression reviewed from outside hospital demonstrated no evidence of acute fracture dislocation.  Of the left lower extremity done at outside hospital impression reviewed demonstrated extensive soft tissue edema noted, heterogeneous mass versus hematoma noted involving the anterior quadriceps muscle, recommend MRI for further evaluation.  Chest x-ray done at outside hospital impression reviewed demonstrated no evidence of acute cardiopulmonary process radiographically.    Initial vital signs /92 pulse 136 respirations 20 temperature 102.2° F O2 saturation 98% on room air.  Patient did have blood cultures collected at OSH.  Patient was started on IV vancomycin and Zosyn therapy.  At the time of rounds patient's heart rate had trended down to 110's. Pt denies any CP, SOB, N/V/D.     Patient was started on broad spectrum antibiotics. HD started per renal team.    Interval Hx:   Patient today awake and comfortable. Tolerating HD. Has been afebrile. Pain well controlled.     Case was discussed with patient's nurse and  on the floor.    Objective/physical exam:  General: In no acute distress  Chest: Clear  to auscultation bilaterally  Heart: RRR, +S1, S2, no appreciable murmur  Abdomen: Soft, nontender, BS +  MSK: + right thigh firm swelling, mild tenderness   Neurologic: Alert and oriented x4, Cranial nerve II-XII intact, Strength 5/5 in all 4 extremities    VITAL SIGNS: 24 HRS MIN & MAX LAST   Temp  Min: 98.1 °F (36.7 °C)  Max: 98.9 °F (37.2 °C) 98.1 °F (36.7 °C)   BP  Min: 141/76  Max: 172/98 (!) 172/98   Pulse  Min: 118  Max: 137  (!) 124   Resp  Min: 18  Max: 20 20   SpO2  Min: 97 %  Max: 100 % 100 %     I have reviewed the following labs:  Recent Labs   Lab 01/13/25  0023 01/14/25  0658   WBC 46.88  46.88* 45.86*   RBC 2.48* 2.28*   HGB 8.4* 7.6*   HCT 25.2* 23.6*   .6* 103.5*   MCH 33.9* 33.3*   MCHC 33.3 32.2*   RDW 12.6 12.7    376   MPV 9.7 9.9     Recent Labs   Lab 01/13/25  0023 01/14/25  0658    134*   K 3.8 4.1    98   CO2 22 21*   BUN 59.3* 80.6*   CREATININE 6.92* 9.33*   CALCIUM 10.2 10.1   MG 1.90  --    ALBUMIN 2.2* 2.2*   ALKPHOS 88 88   ALT 15 15   AST 15 14   BILITOT 0.4 0.4     Microbiology Results (last 7 days)       ** No results found for the last 168 hours. **           Peripheral Smear Evaluation - Leukocytosis with predominantly mature granulocytes; no circulating blasts  - Macrocytic anemia.       See below for Radiology    Assessment/Plan:  Acute left leg pain and swelling- POA  Sepsis-  ? Infected hematoma vs sarcoma   NSTEMI- type 2 from sepsis   Anemia- chronic disease- POA  HTN- urgency- POA  ESRD with HD- POA    Weakness- POA    Plan:  Patient today looks comfortable  Pain controlled  Has been afebrile  Continue IV Vanc + zosyn   Blood cultures today  Thigh swelling ? Infected hematoma vs sarcoma     MRI thigh done, awaiting results     Continue HD per renal team    Reviewed today's labs and  WBC 45, Hb 7.6, Plt 376, Na 134, K 4.1, BUN 80, Crt 9.33    Peripheral smear showed no blast     Continue supportive care     Labs in am     VTE prophylaxis:  SCD    Patient condition:  Guarded    Anticipated discharge and Disposition:   Home       All diagnosis and differential diagnosis have been reviewed; assessment and plan has been documented; I have personally reviewed the labs and test results that are presently available; I have reviewed the patients medication list; I have reviewed the consulting providers response and recommendations. I have reviewed or attempted to review medical records based upon their availability    All of the patient's questions have been  addressed and answered. Patient's is agreeable to the above stated plan. I will continue to monitor closely and make adjustments to medical management as needed.    Portions of this note dictated using EMR integrated voice recognition software, and may be subject to voice recognition errors not corrected at proofreading. Please contact writer for clarification if needed.   _____________________________________________________________________    Malnutrition Status:  Nutrition consulted. Most recent weight and BMI monitored-     Measurements:  Wt Readings from Last 1 Encounters:   01/13/25 90.2 kg (198 lb 13.7 oz)   Body mass index is 27.73 kg/m².    Patient has been screened and assessed by RD.    Malnutrition Type:  Context:    Level:      Malnutrition Characteristic Summary:       Interventions/Recommendations (treatment strategy):        Scheduled Med:   aspirin  81 mg Oral Daily    mupirocin   Nasal BID    piperacillin-tazobactam (Zosyn) IV (PEDS and ADULTS) (extended infusion is not appropriate)  4.5 g Intravenous Q12H      Continuous Infusions:     PRN Meds:    Current Facility-Administered Medications:     acetaminophen, 1,000 mg, Oral, Q6H PRN    aluminum-magnesium hydroxide-simethicone, 30 mL, Oral, QID PRN    bisacodyL, 10 mg, Rectal, Daily PRN    dextrose 50%, 12.5 g, Intravenous, PRN    dextrose 50%, 25 g, Intravenous, PRN    glucagon (human recombinant), 1 mg, Intramuscular, PRN    glucose,  16 g, Oral, PRN    glucose, 24 g, Oral, PRN    hydrALAZINE, 10 mg, Intravenous, Q2H PRN    HYDROcodone-acetaminophen, 1 tablet, Oral, Q6H PRN    melatonin, 6 mg, Oral, Nightly PRN    morphine, 4 mg, Intravenous, Q4H PRN    ondansetron, 4 mg, Intravenous, Q4H PRN    polyethylene glycol, 17 g, Oral, BID PRN    sodium chloride 0.9%, 10 mL, Intravenous, PRN    vancomycin - pharmacy to dose, , Intravenous, pharmacy to manage frequency     Radiology:  I have personally reviewed the following imaging and agree with the radiologist.     No image results found.      Devin Matos MD  Department of Hospital Medicine   [facility]   01/14/2025

## 2025-01-14 NOTE — PROGRESS NOTES
Nephrology  Note    Patient Name: Mildred Atkins  Age: 49 y.o.  : 1975  MRN: [id number]  Admission Date: 2025        Mildred Atkins is a 49 y.o. male Who presents with left leg pain and swelling. PMH significant for ESRD on HD TTS at [facility], hypertension, coronary artery disease, anemia, and secondary hyperparathyroidism.  Patient presented as a transfer from outside hospital due to left leg pain and swelling.  He has been having left leg pain and swelling for about 2 weeks.  After admission he has started on antibiotics for presumed sepsis.  Patient also has severe leukocytosis.  Nephrology consulted for ESRD management.  He denies chest pain, shortness of breath, nausea, vomiting.    2025   He did have MRI done today.  Results are pending.  No new complaints.  Waiting to go for dialysis today.  No chest pain no abdominal pains.  No shortness of breath.  Patient examined again at 2:00 p.m. while on dialysis.    Current Facility-Administered Medications   Medication Dose Route Frequency Provider Last Rate Last Admin    acetaminophen tablet 1,000 mg  1,000 mg Oral Q6H PRN Brittney Gunter, FNP   1,000 mg at 25 0709    aluminum-magnesium hydroxide-simethicone 200-200-20 mg/5 mL suspension 30 mL  30 mL Oral QID PRN Brittney Gunter, FNP        aspirin chewable tablet 81 mg  81 mg Oral Daily Debra Russell, NP   81 mg at 25 0918    bisacodyL suppository 10 mg  10 mg Rectal Daily PRN Brittney Gunter, FNP        dextrose 50% injection 12.5 g  12.5 g Intravenous PRN Brittney Gunter, FNP        dextrose 50% injection 25 g  25 g Intravenous PRN Brittney Gunter, FNP        glucagon (human recombinant) injection 1 mg  1 mg Intramuscular PRN Brittney Gunter, VICTORINAP        glucose chewable tablet 16 g  16 g Oral PRN Brittney Gunter, FNP        glucose chewable tablet 24 g  24 g Oral PRN Brittney Gunter, FNP        hydrALAZINE injection 10 mg  10 mg Intravenous Q2H PRN Pricila Ellis FNP   10 mg  at 01/13/25 1152    HYDROcodone-acetaminophen  mg per tablet 1 tablet  1 tablet Oral Q6H PRN Pricila Ellis FNP   1 tablet at 01/14/25 0407    melatonin tablet 6 mg  6 mg Oral Nightly PRN Brittney Gunter FNP   6 mg at 01/13/25 0043    morphine injection 4 mg  4 mg Intravenous Q4H PRN Pricila Ellis FNP        mupirocin 2 % ointment   Nasal BID Reyes, Thairy G, DO   Given at 01/14/25 0918    ondansetron injection 4 mg  4 mg Intravenous Q4H PRN Brittney Gunter FNP   4 mg at 01/13/25 0842    piperacillin-tazobactam (ZOSYN) 4.5 g in D5W 100 mL IVPB (MB+)  4.5 g Intravenous Q12H Brittney Gunter FNP   Stopped at 01/14/25 0806    polyethylene glycol packet 17 g  17 g Oral BID PRN Brittney Gunter FNP        sodium chloride 0.9% flush 10 mL  10 mL Intravenous PRN Brittney Gunter FNP        vancomycin - pharmacy to dose   Intravenous pharmacy to manage frequency Brittney Gunter FNP           No, Primary Doctor    No past medical history on file.   No past surgical history on file.   No family history on file.  Social History     Tobacco Use    Smoking status: Not on file    Smokeless tobacco: Not on file   Substance Use Topics    Alcohol use: Not on file     Medications Prior to Admission   Medication Sig Dispense Refill Last Dose/Taking    aspirin (ECOTRIN) 81 MG EC tablet Take 81 mg by mouth once daily.   1/11/2025 at  9:00 AM    NIFEdipine (ADALAT CC) 90 MG TbSR Take 90 mg by mouth once daily.   1/11/2025 at  9:00 AM    atorvastatin (LIPITOR) 40 MG tablet Take 40 mg by mouth every evening. Patient states has not filled prescription from pharmacy       benzonatate (TESSALON) 200 MG capsule Take 200 mg by mouth 3 (three) times daily as needed for Cough.       carvediloL (COREG) 12.5 MG tablet Take 12.5 mg by mouth 2 (two) times daily with meals. Supposed to be taking, claims has not picked up medication from pharmacy       chlorproMAZINE (THORAZINE) 50 MG tablet Take 50 mg by mouth 3 (three)  times daily. Has not picked up from pharmacy       cinacalcet (SENSIPAR) 90 MG Tab Take 90 mg by mouth daily with breakfast. Has not picked up from pharmacy       cloNIDine (CATAPRES) 0.1 MG tablet Take 0.1 mg by mouth 2 (two) times daily. Has not picked up from pharmacy       sucroferric oxyhydroxide (VELPHORO) 500 mg Chew Take 500 mg by mouth 3 (three) times daily.        Review of patient's allergies indicates:  No Known Allergies         Review of Systems:     Comprehensive 10pt ROS negative except as noted per history.      Objective:       VITAL SIGNS: 24 HR MIN & MAX LAST    Temp  Min: 97.8 °F (36.6 °C)  Max: 98.9 °F (37.2 °C)  98.2 °F (36.8 °C)        BP  Min: 141/76  Max: 191/103  (!) 141/76     Pulse  Min: 111  Max: 137  (!) 123     Resp  Min: 18  Max: 20  20    SpO2  Min: 97 %  Max: 99 %  99 %      GEN: Well appearing AA male in NAD  HEENT:  Atraumatic normocephalic.  Pupils reactive.  Extraocular movements intact.  No oral lesion.  Neck supple.  No JVD.  CV: RRR +S1,S2 without murmur  PULM: CTAB, unlabored  ABD: Soft, NT/ND abdomen with NABS  EXT:  Left lower extremity swelling around thigh, nontender and non pulsating no induration. No edema  SKIN: Warm and dry  PSYCH: Awake, alert and appropriately conversant.   Dialysis access:  RUE AVF with good pulsation and thrill            Component Value Date/Time     (L) 01/14/2025 0658     01/13/2025 0023     09/24/2021 1425     08/20/2021 0646    K 4.1 01/14/2025 0658    K 3.8 01/13/2025 0023    K 3.4 (L) 09/24/2021 1425    K 3.3 (L) 08/20/2021 0646    CO2 21 (L) 01/14/2025 0658    CO2 22 01/13/2025 0023    CO2 34 (H) 09/24/2021 1425    CO2 31 08/20/2021 0646    BUN 80.6 (H) 01/14/2025 0658    BUN 59.3 (H) 01/13/2025 0023    BUN 44 (H) 09/24/2021 1425    BUN 45 (H) 08/20/2021 0646    CREATININE 9.33 (H) 01/14/2025 0658    CREATININE 6.92 (H) 01/13/2025 0023    CREATININE 6.22 (H) 09/24/2021 1425    CREATININE 6.81 (H) 08/20/2021 0646     CALCIUM 10.1 01/14/2025 0658    CALCIUM 10.2 01/13/2025 0023    CALCIUM 9.7 05/21/2024 0000    CALCIUM 9.5 09/24/2021 1425            Component Value Date/Time    WBC 45.86 (H) 01/14/2025 0658    WBC 46.88 (H) 01/13/2025 0023    WBC 46.88 01/13/2025 0023    HGB 7.6 (L) 01/14/2025 0658    HGB 8.4 (L) 01/13/2025 0023    HGB 11.2 (L) 12/30/2024 0000    HGB 11.6 (L) 12/23/2024 0000    HCT 23.6 (L) 01/14/2025 0658    HCT 25.2 (L) 01/13/2025 0023     01/14/2025 0658     01/13/2025 0023             MRI Femur W WO Contrast Left    (Results Pending)       Assessment / Plan:       ESRD - normally T[place] at [facility]  Sepsis  Leukocytosis  Anemia  Hypertension    Plan:  Hemodialysis today  Wait for the results of MRI to see about left thigh mass.

## 2025-01-15 LAB
ABO + RH BLD: NORMAL
ABORH RETYPE: NORMAL
ANION GAP SERPL CALC-SCNC: 13 MEQ/L
BASOPHILS # BLD AUTO: 0.09 X10(3)/MCL
BASOPHILS NFR BLD AUTO: 0.3 %
BLD PROD TYP BPU: NORMAL
BLOOD UNIT EXPIRATION DATE: NORMAL
BLOOD UNIT TYPE CODE: 9500
BUN SERPL-MCNC: 51.4 MG/DL (ref 8.9–20.6)
CALCIUM SERPL-MCNC: 10 MG/DL (ref 8.4–10.2)
CHLORIDE SERPL-SCNC: 100 MMOL/L (ref 98–107)
CO2 SERPL-SCNC: 22 MMOL/L (ref 22–29)
CREAT SERPL-MCNC: 7.16 MG/DL (ref 0.72–1.25)
CREAT/UREA NIT SERPL: 7
CROSSMATCH INTERPRETATION: NORMAL
DISPENSE STATUS: NORMAL
EOSINOPHIL # BLD AUTO: 0.14 X10(3)/MCL (ref 0–0.9)
EOSINOPHIL NFR BLD AUTO: 0.4 %
ERYTHROCYTE [DISTWIDTH] IN BLOOD BY AUTOMATED COUNT: 12.8 % (ref 11.5–17)
GFR SERPLBLD CREATININE-BSD FMLA CKD-EPI: 9 ML/MIN/1.73/M2
GLUCOSE SERPL-MCNC: 100 MG/DL (ref 74–100)
GROUP & RH: NORMAL
HBV SURFACE AB SER QL IA: POSITIVE
HBV SURFACE AB SERPL IA-ACNC: 664 MIU/ML
HCT VFR BLD AUTO: 21.1 % (ref 42–52)
HCT VFR BLD AUTO: 22 % (ref 42–52)
HGB BLD-MCNC: 6.8 G/DL (ref 14–18)
HGB BLD-MCNC: 7.1 G/DL (ref 14–18)
IMM GRANULOCYTES # BLD AUTO: 0.59 X10(3)/MCL (ref 0–0.04)
IMM GRANULOCYTES NFR BLD AUTO: 1.8 %
INDIRECT COOMBS: NORMAL
LYMPHOCYTES # BLD AUTO: 0.97 X10(3)/MCL (ref 0.6–4.6)
LYMPHOCYTES NFR BLD AUTO: 3 %
MCH RBC QN AUTO: 33.3 PG (ref 27–31)
MCHC RBC AUTO-ENTMCNC: 32.2 G/DL (ref 33–36)
MCV RBC AUTO: 103.4 FL (ref 80–94)
MONOCYTES # BLD AUTO: 1.71 X10(3)/MCL (ref 0.1–1.3)
MONOCYTES NFR BLD AUTO: 5.3 %
NEUTROPHILS # BLD AUTO: 28.88 X10(3)/MCL (ref 2.1–9.2)
NEUTROPHILS NFR BLD AUTO: 89.2 %
NRBC BLD AUTO-RTO: 0 %
OHS QRS DURATION: 102 MS
OHS QRS DURATION: 96 MS
OHS QTC CALCULATION: 453 MS
OHS QTC CALCULATION: 456 MS
PLATELET # BLD AUTO: 384 X10(3)/MCL (ref 130–400)
PMV BLD AUTO: 10.1 FL (ref 7.4–10.4)
POTASSIUM SERPL-SCNC: 4.3 MMOL/L (ref 3.5–5.1)
RBC # BLD AUTO: 2.04 X10(6)/MCL (ref 4.7–6.1)
SODIUM SERPL-SCNC: 135 MMOL/L (ref 136–145)
SPECIMEN OUTDATE: NORMAL
UNIT NUMBER: NORMAL
WBC # BLD AUTO: 32.38 X10(3)/MCL (ref 4.5–11.5)

## 2025-01-15 PROCEDURE — 85025 COMPLETE CBC W/AUTO DIFF WBC: CPT | Performed by: INTERNAL MEDICINE

## 2025-01-15 PROCEDURE — 21400001 HC TELEMETRY ROOM

## 2025-01-15 PROCEDURE — 25000003 PHARM REV CODE 250

## 2025-01-15 PROCEDURE — 86923 COMPATIBILITY TEST ELECTRIC: CPT

## 2025-01-15 PROCEDURE — 63600175 PHARM REV CODE 636 W HCPCS

## 2025-01-15 PROCEDURE — 85014 HEMATOCRIT: CPT

## 2025-01-15 PROCEDURE — 36430 TRANSFUSION BLD/BLD COMPNT: CPT

## 2025-01-15 PROCEDURE — 86900 BLOOD TYPING SEROLOGIC ABO: CPT

## 2025-01-15 PROCEDURE — 30233N1 TRANSFUSION OF NONAUTOLOGOUS RED BLOOD CELLS INTO PERIPHERAL VEIN, PERCUTANEOUS APPROACH: ICD-10-PCS | Performed by: HOSPITALIST

## 2025-01-15 PROCEDURE — P9016 RBC LEUKOCYTES REDUCED: HCPCS

## 2025-01-15 PROCEDURE — 80048 BASIC METABOLIC PNL TOTAL CA: CPT | Performed by: INTERNAL MEDICINE

## 2025-01-15 PROCEDURE — 36415 COLL VENOUS BLD VENIPUNCTURE: CPT

## 2025-01-15 PROCEDURE — 36415 COLL VENOUS BLD VENIPUNCTURE: CPT | Performed by: INTERNAL MEDICINE

## 2025-01-15 PROCEDURE — 25000003 PHARM REV CODE 250: Performed by: NURSE PRACTITIONER

## 2025-01-15 PROCEDURE — 25000003 PHARM REV CODE 250: Performed by: INTERNAL MEDICINE

## 2025-01-15 PROCEDURE — 63600175 PHARM REV CODE 636 W HCPCS: Performed by: INTERNAL MEDICINE

## 2025-01-15 RX ORDER — HYDROCODONE BITARTRATE AND ACETAMINOPHEN 500; 5 MG/1; MG/1
TABLET ORAL
Status: DISCONTINUED | OUTPATIENT
Start: 2025-01-15 | End: 2025-01-19 | Stop reason: HOSPADM

## 2025-01-15 RX ADMIN — CLONIDINE HYDROCHLORIDE 0.1 MG: 0.1 TABLET ORAL at 08:01

## 2025-01-15 RX ADMIN — PIPERACILLIN SODIUM AND TAZOBACTAM SODIUM 4.5 G: 4; .5 INJECTION, POWDER, LYOPHILIZED, FOR SOLUTION INTRAVENOUS at 05:01

## 2025-01-15 RX ADMIN — MUPIROCIN: 20 OINTMENT TOPICAL at 08:01

## 2025-01-15 RX ADMIN — HYDROCODONE BITARTRATE AND ACETAMINOPHEN 1 TABLET: 10; 325 TABLET ORAL at 09:01

## 2025-01-15 RX ADMIN — HYDROCODONE BITARTRATE AND ACETAMINOPHEN 1 TABLET: 10; 325 TABLET ORAL at 06:01

## 2025-01-15 RX ADMIN — HYDROCODONE BITARTRATE AND ACETAMINOPHEN 1 TABLET: 10; 325 TABLET ORAL at 12:01

## 2025-01-15 RX ADMIN — PIPERACILLIN SODIUM AND TAZOBACTAM SODIUM 4.5 G: 4; .5 INJECTION, POWDER, LYOPHILIZED, FOR SOLUTION INTRAVENOUS at 02:01

## 2025-01-15 RX ADMIN — CINACALCET HYDROCHLORIDE 90 MG: 30 TABLET, FILM COATED ORAL at 10:01

## 2025-01-15 RX ADMIN — NIFEDIPINE 90 MG: 90 TABLET, FILM COATED, EXTENDED RELEASE ORAL at 10:01

## 2025-01-15 RX ADMIN — CLONIDINE HYDROCHLORIDE 0.1 MG: 0.1 TABLET ORAL at 10:01

## 2025-01-15 RX ADMIN — ALUMINUM HYDROXIDE, MAGNESIUM HYDROXIDE, AND SIMETHICONE 30 ML: 200; 200; 20 SUSPENSION ORAL at 06:01

## 2025-01-15 RX ADMIN — MUPIROCIN: 20 OINTMENT TOPICAL at 10:01

## 2025-01-15 RX ADMIN — ASPIRIN 81 MG: 81 TABLET, COATED ORAL at 10:01

## 2025-01-15 RX ADMIN — ONDANSETRON 4 MG: 2 INJECTION INTRAMUSCULAR; INTRAVENOUS at 07:01

## 2025-01-15 RX ADMIN — CARVEDILOL 12.5 MG: 12.5 TABLET, FILM COATED ORAL at 10:01

## 2025-01-15 RX ADMIN — CARVEDILOL 12.5 MG: 12.5 TABLET, FILM COATED ORAL at 05:01

## 2025-01-15 NOTE — CARE UPDATE
MRI right thigh     Impression:     A 10 cm x 8 cm by 9 cm complex mass is present to the anterior portion of the proximal thigh and primarily involves the rectus femoris and vastus intermedius muscles. This demonstrates thick nodular peripheral enhancement and a cystic/necrotic central component.  The margin is aggressive involving the quadriceps and adductor muscles.  Primary differential considerations at this point include an aggressive appearing intramuscular abscess with infectious myositis involving the muscles of the anterior thigh and an aggressive soft tissue malignancy which demonstrates central necrosis.  Please correlate with the patient's history as to the need for additional imaging and or referral to Infectious Disease or orthopedic oncology.     A separate fat containing mass is partially visualized to the medial aspect of the distal thigh and measures at least 8 cm by 5 cm by 3 cm in size. This demonstrates thin enhancing internal septations and is present just anterior to the distal portion of the semimembranosus muscle.  The features typically associated with liposarcoma are not well demonstrated on today's study however the majority the lesion is not included in the field of view and may be further evaluated with MRI of the distal thigh/knee if additional imaging is indicated.     The bone marrow is poorly demonstrated on today's study but appears abnormal.  This may be further evaluated with bone scan, CT scan, or both depending on the patient's medical history.      Consult Ortho team for biopsy vs removal of the mass

## 2025-01-15 NOTE — AI DETERIORATION ALERT
Artificial Intelligence Notification  [facility]  [address]  [place] [address]  Phone: [phone]    This documentation was triggered by an Artificial Intelligence Notification:    Admit Date: 2025   LOS: 2  Code Status: Full Code  : 1975  Age: 49 y.o.  Weight:   Wt Readings from Last 1 Encounters:   25 90.2 kg (198 lb 13.7 oz)        Sex: male  Bed: 634/[facility]  MRN: [id number]  Attending Physician: Devin Matos MD     Date of Alert: 2025  Time AI Alert Received: 2302            Vitals:    25 2248   BP:    Pulse:    Resp: 18   Temp:      SpO2: (!) 90 %      Artificial Intelligence alert discussed with Provider:     Name: JOSE JUAN Foreman   Date/Time of Provider Notification: 0000      Patient Condition: Pt came in with complaints of left leg pain. PMH include ESRD om HD, HTN, and anemia. Started on broad spectrum abx for presumed sepsis. Orthopedic is on board for Leg. Spoke with RN and she stated the Hospatilist has been contacted, awaiting response/orders. Recommended RN to ask hospatilist if wanted a repeat H&H and possibly a unit of blood since H&H was a little low on am labs. Pt is AAO but complaining of pain to leg. PRN pain medications are on board for patient. Told RN to contact me if she needed anything or patients status changes.

## 2025-01-15 NOTE — PROGRESS NOTES
Nephrology  Note    Patient Name: Mildred Atkins  Age: 49 y.o.  : 1975  MRN: [id number]  Admission Date: 2025        Mildred Atkins is a 49 y.o. male Who presents with left leg pain and swelling. PMH significant for ESRD on HD TTS at [facility], hypertension, coronary artery disease, anemia, and secondary hyperparathyroidism.  Patient presented as a transfer from outside hospital due to left leg pain and swelling.  He has been having left leg pain and swelling for about 2 weeks.  After admission he has started on antibiotics for presumed sepsis.  Patient also has severe leukocytosis.  Nephrology consulted for ESRD management.  He denies chest pain, shortness of breath, nausea, vomiting.    2025   He did have MRI done today.  Results are pending.  No new complaints.  Waiting to go for dialysis today.  No chest pain no abdominal pains.  No shortness of breath.  Patient examined again at 2:00 p.m. while on dialysis.    01/15/2025   MRI results noted.  It looks like he will be going to ortho oncology in [place] or [facility].  Sitting up in the bed and has no new complaints.    Current Facility-Administered Medications   Medication Dose Route Frequency Provider Last Rate Last Admin    0.9%  NaCl infusion (for blood administration)   Intravenous Q24H PRN Brittney Gunter FNP        acetaminophen tablet 1,000 mg  1,000 mg Oral Q6H PRN Brittney Gunter FNP   1,000 mg at 25 0709    aluminum-magnesium hydroxide-simethicone 200-200-20 mg/5 mL suspension 30 mL  30 mL Oral QID PRN Brittney Gunter FNP        aspirin EC tablet 81 mg  81 mg Oral Daily Ludin Deutsch MD   81 mg at 01/15/25 1042    bisacodyL suppository 10 mg  10 mg Rectal Daily PRN Brittney Gunter FNP        carvediloL tablet 12.5 mg  12.5 mg Oral BID WM Ludin Deutsch MD   12.5 mg at 01/15/25 1042    cinacalcet tablet 90 mg  90 mg Oral Daily with breakfast Ludin Deutsch MD   90 mg at 01/15/25 1041    cloNIDine  tablet 0.1 mg  0.1 mg Oral BID Ludin Deutsch MD   0.1 mg at 01/15/25 1041    dextrose 50% injection 12.5 g  12.5 g Intravenous PRN Brittney Gunter, VICTORINAP        dextrose 50% injection 25 g  25 g Intravenous PRN Kirti Guntersa, FNP        glucagon (human recombinant) injection 1 mg  1 mg Intramuscular PRN Brittney Gunter, FNP        glucose chewable tablet 16 g  16 g Oral PRN Brittney Gunter, ROHINI        glucose chewable tablet 24 g  24 g Oral PRN Brittney Gunter, FNP        hydrALAZINE injection 10 mg  10 mg Intravenous Q2H PRN Pricila Ellis FNP   10 mg at 01/13/25 1152    HYDROcodone-acetaminophen  mg per tablet 1 tablet  1 tablet Oral Q6H PRN Pricila Ellis FNP   1 tablet at 01/15/25 0608    melatonin tablet 6 mg  6 mg Oral Nightly PRN Brittney Gunter FNP   6 mg at 01/14/25 1944    morphine injection 4 mg  4 mg Intravenous Q4H PRN Pricila Ellis FNP        mupirocin 2 % ointment   Nasal BID Reyes, Dejan G, DO   Given at 01/15/25 1042    NIFEdipine 24 hr tablet 90 mg  90 mg Oral Daily Ludin Deutsch MD   90 mg at 01/15/25 1042    ondansetron injection 4 mg  4 mg Intravenous Q4H PRN Brittney Gunter FNP   4 mg at 01/13/25 0842    piperacillin-tazobactam (ZOSYN) 4.5 g in D5W 100 mL IVPB (MB+)  4.5 g Intravenous Q12H Brittney Gunter, FNP   Stopped at 01/15/25 0612    polyethylene glycol packet 17 g  17 g Oral BID PRN Brittney Gunter, VICTORINAP        sodium chloride 0.9% flush 10 mL  10 mL Intravenous PRN Kirti Guntersa, FNP        vancomycin - pharmacy to dose   Intravenous pharmacy to manage frequency Brittney Gunter, FNP           No, Primary Doctor    No past medical history on file.   No past surgical history on file.   No family history on file.  Social History     Tobacco Use    Smoking status: Not on file    Smokeless tobacco: Not on file   Substance Use Topics    Alcohol use: Not on file     Medications Prior to Admission   Medication Sig Dispense Refill Last Dose/Taking     aspirin (ECOTRIN) 81 MG EC tablet Take 81 mg by mouth once daily.   1/11/2025 at  9:00 AM    NIFEdipine (ADALAT CC) 90 MG TbSR Take 90 mg by mouth once daily.   1/11/2025 at  9:00 AM    atorvastatin (LIPITOR) 40 MG tablet Take 40 mg by mouth every evening. Patient states has not filled prescription from pharmacy       carvediloL (COREG) 12.5 MG tablet Take 12.5 mg by mouth 2 (two) times daily with meals. Supposed to be taking, claims has not picked up medication from pharmacy       cinacalcet (SENSIPAR) 90 MG Tab Take 90 mg by mouth daily with breakfast. Has not picked up from pharmacy       cloNIDine (CATAPRES) 0.1 MG tablet Take 0.1 mg by mouth 2 (two) times daily. Has not picked up from pharmacy       sucroferric oxyhydroxide (VELPHORO) 500 mg Chew Take 500 mg by mouth 3 (three) times daily.       [DISCONTINUED] benzonatate (TESSALON) 200 MG capsule Take 200 mg by mouth 3 (three) times daily as needed for Cough.       [DISCONTINUED] chlorproMAZINE (THORAZINE) 50 MG tablet Take 50 mg by mouth 3 (three) times daily. Has not picked up from pharmacy        Review of patient's allergies indicates:  No Known Allergies         Review of Systems:     Comprehensive 10pt ROS negative except as noted per history.      Objective:       VITAL SIGNS: 24 HR MIN & MAX LAST    Temp  Min: 98.1 °F (36.7 °C)  Max: 99.4 °F (37.4 °C)  99 °F (37.2 °C)        BP  Min: 98/63  Max: 172/98  122/66     Pulse  Min: 114  Max: 145  (!) 117     Resp  Min: 18  Max: 22  18    SpO2  Min: 95 %  Max: 100 %  100 %      GEN: Well appearing AA male in NAD  HEENT:  Atraumatic normocephalic.  Pupils reactive.  Extraocular movements intact.  No oral lesion.  Neck supple.  No JVD.  CV: RRR +S1,S2 without murmur  PULM: CTAB, unlabored  ABD: Soft, NT/ND abdomen with NABS  EXT:  Left lower extremity swelling around thigh, nontender and non pulsating no induration. No edema  SKIN: Warm and dry  PSYCH: Awake, alert and appropriately conversant.   Dialysis  access:  RUE AVF with good pulsation and thrill            Component Value Date/Time     (L) 01/15/2025 0339     (L) 01/14/2025 0658     09/24/2021 1425     08/20/2021 0646    K 4.3 01/15/2025 0339    K 4.1 01/14/2025 0658    K 3.4 (L) 09/24/2021 1425    K 3.3 (L) 08/20/2021 0646    CO2 22 01/15/2025 0339    CO2 21 (L) 01/14/2025 0658    CO2 34 (H) 09/24/2021 1425    CO2 31 08/20/2021 0646    BUN 51.4 (H) 01/15/2025 0339    BUN 80.6 (H) 01/14/2025 0658    BUN 44 (H) 09/24/2021 1425    BUN 45 (H) 08/20/2021 0646    CREATININE 7.16 (H) 01/15/2025 0339    CREATININE 9.33 (H) 01/14/2025 0658    CREATININE 6.22 (H) 09/24/2021 1425    CREATININE 6.81 (H) 08/20/2021 0646    CALCIUM 10.0 01/15/2025 0339    CALCIUM 10.1 01/14/2025 0658    CALCIUM 9.7 05/21/2024 0000    CALCIUM 9.5 09/24/2021 1425            Component Value Date/Time    WBC 32.38 (H) 01/15/2025 0339    WBC 45.86 (H) 01/14/2025 0658    WBC 46.88 01/13/2025 0023    HGB 6.8 (L) 01/15/2025 0339    HGB 7.1 (L) 01/15/2025 0031    HGB 11.2 (L) 12/30/2024 0000    HGB 11.6 (L) 12/23/2024 0000    HCT 21.1 (L) 01/15/2025 0339    HCT 22.0 (L) 01/15/2025 0031     01/15/2025 0339     01/14/2025 0658             MRI Femur W WO Contrast Left   Final Result      A 10 cm x 8 cm by 9 cm complex mass is present to the anterior portion of the proximal thigh and primarily involves the rectus femoris and vastus intermedius muscles. This demonstrates thick nodular peripheral enhancement and a cystic/necrotic central component.  The margin is aggressive involving the quadriceps and adductor muscles.  Primary differential considerations at this point include an aggressive appearing intramuscular abscess with infectious myositis involving the muscles of the anterior thigh and an aggressive soft tissue malignancy which demonstrates central necrosis.  Please correlate with the patient's history as to the need for additional imaging and or referral to  Infectious Disease or orthopedic oncology.      A separate fat containing mass is partially visualized to the medial aspect of the distal thigh and measures at least 8 cm by 5 cm by 3 cm in size. This demonstrates thin enhancing internal septations and is present just anterior to the distal portion of the semimembranosus muscle.  The features typically associated with liposarcoma are not well demonstrated on today's study however the majority the lesion is not included in the field of view and may be further evaluated with MRI of the distal thigh/knee if additional imaging is indicated.      The bone marrow is poorly demonstrated on today's study but appears abnormal.  This may be further evaluated with bone scan, CT scan, or both depending on the patient's medical history.         Electronically signed by: Serjio Hutchison   Date:    01/14/2025   Time:    16:12          Assessment / Plan:       ESRD - normally TTS at [facility] [place]  Sepsis  Leukocytosis  Anemia  Hypertension      Plan:  Continue to provide dialysis on TTS schedule  Continue all other medical management

## 2025-01-16 LAB
ABO + RH BLD: NORMAL
ABO + RH BLD: NORMAL
ALBUMIN SERPL-MCNC: 2 G/DL (ref 3.5–5)
ALBUMIN/GLOB SERPL: 0.5 RATIO (ref 1.1–2)
ALP SERPL-CCNC: 110 UNIT/L (ref 40–150)
ALT SERPL-CCNC: 22 UNIT/L (ref 0–55)
ANION GAP SERPL CALC-SCNC: 12 MEQ/L
AST SERPL-CCNC: 27 UNIT/L (ref 5–34)
BASOPHILS # BLD AUTO: 0.12 X10(3)/MCL
BASOPHILS NFR BLD AUTO: 0.4 %
BILIRUB SERPL-MCNC: 0.4 MG/DL
BLD PROD TYP BPU: NORMAL
BLD PROD TYP BPU: NORMAL
BLOOD UNIT EXPIRATION DATE: NORMAL
BLOOD UNIT EXPIRATION DATE: NORMAL
BLOOD UNIT TYPE CODE: 5100
BLOOD UNIT TYPE CODE: 9500
BUN SERPL-MCNC: 70.9 MG/DL (ref 8.9–20.6)
CALCIUM SERPL-MCNC: 9.6 MG/DL (ref 8.4–10.2)
CHLORIDE SERPL-SCNC: 99 MMOL/L (ref 98–107)
CO2 SERPL-SCNC: 22 MMOL/L (ref 22–29)
CREAT SERPL-MCNC: 8.27 MG/DL (ref 0.72–1.25)
CREAT/UREA NIT SERPL: 9
CROSSMATCH INTERPRETATION: NORMAL
CROSSMATCH INTERPRETATION: NORMAL
DISPENSE STATUS: NORMAL
DISPENSE STATUS: NORMAL
EOSINOPHIL # BLD AUTO: 0.62 X10(3)/MCL (ref 0–0.9)
EOSINOPHIL NFR BLD AUTO: 2.1 %
ERYTHROCYTE [DISTWIDTH] IN BLOOD BY AUTOMATED COUNT: 15.2 % (ref 11.5–17)
GFR SERPLBLD CREATININE-BSD FMLA CKD-EPI: 7 ML/MIN/1.73/M2
GLOBULIN SER-MCNC: 4.3 GM/DL (ref 2.4–3.5)
GLUCOSE SERPL-MCNC: 112 MG/DL (ref 74–100)
HCT VFR BLD AUTO: 19.8 % (ref 42–52)
HGB BLD-MCNC: 6.5 G/DL (ref 14–18)
IMM GRANULOCYTES # BLD AUTO: 0.98 X10(3)/MCL (ref 0–0.04)
IMM GRANULOCYTES NFR BLD AUTO: 3.2 %
LYMPHOCYTES # BLD AUTO: 1.1 X10(3)/MCL (ref 0.6–4.6)
LYMPHOCYTES NFR BLD AUTO: 3.6 %
MCH RBC QN AUTO: 33.5 PG (ref 27–31)
MCHC RBC AUTO-ENTMCNC: 32.8 G/DL (ref 33–36)
MCV RBC AUTO: 102.1 FL (ref 80–94)
MONOCYTES # BLD AUTO: 2.09 X10(3)/MCL (ref 0.1–1.3)
MONOCYTES NFR BLD AUTO: 6.9 %
NEUTROPHILS # BLD AUTO: 25.32 X10(3)/MCL (ref 2.1–9.2)
NEUTROPHILS NFR BLD AUTO: 83.8 %
NRBC BLD AUTO-RTO: 0.1 %
PHOSPHATE SERPL-MCNC: 5.8 MG/DL (ref 2.3–4.7)
PLATELET # BLD AUTO: 359 X10(3)/MCL (ref 130–400)
PMV BLD AUTO: 9.7 FL (ref 7.4–10.4)
POTASSIUM SERPL-SCNC: 4.1 MMOL/L (ref 3.5–5.1)
PROT SERPL-MCNC: 6.3 GM/DL (ref 6.4–8.3)
PTH-INTACT SERPL-MCNC: 700.4 PG/ML (ref 8.7–77)
RBC # BLD AUTO: 1.94 X10(6)/MCL (ref 4.7–6.1)
SODIUM SERPL-SCNC: 133 MMOL/L (ref 136–145)
UNIT NUMBER: NORMAL
UNIT NUMBER: NORMAL
VANCOMYCIN SERPL-MCNC: 12.6 UG/ML (ref 15–20)
WBC # BLD AUTO: 30.23 X10(3)/MCL (ref 4.5–11.5)

## 2025-01-16 PROCEDURE — 36415 COLL VENOUS BLD VENIPUNCTURE: CPT | Performed by: INTERNAL MEDICINE

## 2025-01-16 PROCEDURE — 63600175 PHARM REV CODE 636 W HCPCS: Performed by: HOSPITALIST

## 2025-01-16 PROCEDURE — 80053 COMPREHEN METABOLIC PANEL: CPT | Performed by: INTERNAL MEDICINE

## 2025-01-16 PROCEDURE — 25000003 PHARM REV CODE 250: Performed by: NURSE PRACTITIONER

## 2025-01-16 PROCEDURE — 80202 ASSAY OF VANCOMYCIN: CPT | Performed by: INTERNAL MEDICINE

## 2025-01-16 PROCEDURE — 63600175 PHARM REV CODE 636 W HCPCS

## 2025-01-16 PROCEDURE — 25000003 PHARM REV CODE 250

## 2025-01-16 PROCEDURE — 25500020 PHARM REV CODE 255: Performed by: INTERNAL MEDICINE

## 2025-01-16 PROCEDURE — 5A1D70Z PERFORMANCE OF URINARY FILTRATION, INTERMITTENT, LESS THAN 6 HOURS PER DAY: ICD-10-PCS | Performed by: HOSPITALIST

## 2025-01-16 PROCEDURE — 84100 ASSAY OF PHOSPHORUS: CPT | Performed by: INTERNAL MEDICINE

## 2025-01-16 PROCEDURE — 63600175 PHARM REV CODE 636 W HCPCS: Performed by: INTERNAL MEDICINE

## 2025-01-16 PROCEDURE — 25000003 PHARM REV CODE 250: Performed by: INTERNAL MEDICINE

## 2025-01-16 PROCEDURE — 85025 COMPLETE CBC W/AUTO DIFF WBC: CPT | Performed by: INTERNAL MEDICINE

## 2025-01-16 PROCEDURE — P9040 RBC LEUKOREDUCED IRRADIATED: HCPCS

## 2025-01-16 PROCEDURE — 21400001 HC TELEMETRY ROOM

## 2025-01-16 PROCEDURE — 80100016 HC MAINTENANCE HEMODIALYSIS

## 2025-01-16 PROCEDURE — P9016 RBC LEUKOCYTES REDUCED: HCPCS | Performed by: HOSPITALIST

## 2025-01-16 PROCEDURE — 25000003 PHARM REV CODE 250: Performed by: HOSPITALIST

## 2025-01-16 PROCEDURE — 83970 ASSAY OF PARATHORMONE: CPT | Performed by: INTERNAL MEDICINE

## 2025-01-16 PROCEDURE — 86923 COMPATIBILITY TEST ELECTRIC: CPT | Mod: 91 | Performed by: HOSPITALIST

## 2025-01-16 PROCEDURE — 86923 COMPATIBILITY TEST ELECTRIC: CPT

## 2025-01-16 RX ORDER — HYDROCODONE BITARTRATE AND ACETAMINOPHEN 500; 5 MG/1; MG/1
TABLET ORAL
Status: DISCONTINUED | OUTPATIENT
Start: 2025-01-16 | End: 2025-01-19 | Stop reason: HOSPADM

## 2025-01-16 RX ADMIN — CARVEDILOL 12.5 MG: 12.5 TABLET, FILM COATED ORAL at 08:01

## 2025-01-16 RX ADMIN — CLONIDINE HYDROCHLORIDE 0.1 MG: 0.1 TABLET ORAL at 08:01

## 2025-01-16 RX ADMIN — PIPERACILLIN SODIUM AND TAZOBACTAM SODIUM 4.5 G: 4; .5 INJECTION, POWDER, LYOPHILIZED, FOR SOLUTION INTRAVENOUS at 02:01

## 2025-01-16 RX ADMIN — HYDROCODONE BITARTRATE AND ACETAMINOPHEN 1 TABLET: 10; 325 TABLET ORAL at 07:01

## 2025-01-16 RX ADMIN — CARVEDILOL 12.5 MG: 12.5 TABLET, FILM COATED ORAL at 07:01

## 2025-01-16 RX ADMIN — NIFEDIPINE 90 MG: 90 TABLET, FILM COATED, EXTENDED RELEASE ORAL at 08:01

## 2025-01-16 RX ADMIN — SODIUM CHLORIDE 125 MG: 9 INJECTION, SOLUTION INTRAVENOUS at 08:01

## 2025-01-16 RX ADMIN — IOHEXOL 90 ML: 350 INJECTION, SOLUTION INTRAVENOUS at 05:01

## 2025-01-16 RX ADMIN — HYDROCODONE BITARTRATE AND ACETAMINOPHEN 1 TABLET: 10; 325 TABLET ORAL at 01:01

## 2025-01-16 RX ADMIN — ASPIRIN 81 MG: 81 TABLET, COATED ORAL at 08:01

## 2025-01-16 RX ADMIN — MUPIROCIN: 20 OINTMENT TOPICAL at 08:01

## 2025-01-16 RX ADMIN — CINACALCET HYDROCHLORIDE 90 MG: 30 TABLET, FILM COATED ORAL at 08:01

## 2025-01-16 NOTE — PROGRESS NOTES
"Brief Ortho Trauma note:    Patient seen this am. Comfortable, tolerating PO.   States he noticed increased swelling to his left medial thigh "about 2 weeks ago" endorses increased pain in the area, mostly shooting pains associated with it. Area is firm on palpation, no fluctuance notes, no associated wounds or cellulitis.      PMH HTN, CAD, ESRD with dialysis. No evidence of fracture on xray or advanced imaging. Large medial thigh lesion that needs workup. IM has been discussing case with Dr Ward in [facility]. No orthopedic trauma intervention or followup needed.     POC DW Dr Avery Acosta FNP-C  "

## 2025-01-16 NOTE — PLAN OF CARE
Appointment for biopsy was scheduled with Ivy on 1/22/2025 @ 1:00 at [facility] in [place].

## 2025-01-16 NOTE — CARE UPDATE
Spoke to Dr Eduard Ward - Ortho oncologist at [facility]   Discussed in detail about the patient     Recommendation:   Hold transfer   Consult IR for mass biopsy   Usually path report will take 2-4 weeks   F/U in Dr Ward clinic in [facility]   His Coordinator Ph     Check PTH and phos level. In ESRD high Phos can cause pseudotumor   Check XR femur  CT chest and abdomen for staging     If our IR is not available, then inform Dr Ward's coordinator to set up out patient appointment for Biopsy in his clinic     If sarcoma, patient will need radiation tx and then surgery.     Informed CM about the above plan.

## 2025-01-16 NOTE — NURSING
01/16/25 1647   Post-Hemodialysis Assessment   Blood Volume Processed (Liters) 71.5 L   Dialyzer Clearance Lightly streaked   Duration of Treatment 180 minutes   Total UF (mL) 2600 mL   Patient Response to Treatment 2 units prbc given, an extra 600 ml removed for prbc's, tolerated well

## 2025-01-16 NOTE — PT/OT/SLP PROGRESS
Occupational Therapy      Patient Name:  Mildred Atkins   MRN:  [id number]    OT eval and treat orders received, pt chart reviewed. Pt with low H&H and receiving 2 units RBC this morning. Re-attempted this afternoon at 1351 & 1442, but pt OOR for dialysis. Will follow-up tomorrow/schedule permitting.    1/16/2025

## 2025-01-16 NOTE — PROGRESS NOTES
Nephrology consult follow up note    HPI:      Mildred Atkins is a 49 y.o. male Who presents with left leg pain and swelling. PMH significant for ESRD on HD TTS at [facility], hypertension, coronary artery disease, anemia, and secondary hyperparathyroidism. Patient presented as a transfer from outside hospital due to left leg pain and swelling. He has been having left leg pain and swelling for about 2 weeks. After admission he has started on antibiotics for presumed sepsis. Patient also has severe leukocytosis. Nephrology consulted for ESRD management.  Workup for left thigh swelling concerning for malignant sarcoma.    Interval history:     01/14/2025   He did have MRI done today.  Results are pending.  No new complaints.  Waiting to go for dialysis today.  No chest pain no abdominal pains.  No shortness of breath.  Patient examined again at 2:00 p.m. while on dialysis.     01/15/2025   MRI results noted.  It looks like he will be going to ortho oncology in [place] or [facility].  Sitting up in the bed and has no new complaints.    01/16/2025  No acute events overnight.  No new complaints.  Continues to have left thigh swelling.  No chest pain, shortness of breath, nausea vomiting.     Review of Systems:     Comprehensive 10pt ROS negative except as noted per history.    Past medical, family, surgical, and social history reviewed and unchanged from initial consult note.     Objective:       VITAL SIGNS: 24 HR MIN & MAX LAST    Temp  Min: 97.8 °F (36.6 °C)  Max: 100.1 °F (37.8 °C)  97.8 °F (36.6 °C)        BP  Min: 119/75  Max: 145/72  (!) 142/87     Pulse  Min: 99  Max: 120  99     Resp  Min: 18  Max: 20  18    SpO2  Min: 97 %  Max: 100 %  100 %      GEN:  Well-appearing AA male  CV: RRR +S1,S2 without murmur  PULM: CTAB, unlabored  ABD: Soft, NT/ND abdomen with NABS  EXT: No cyanosis or edema.  Swelling of left thigh  SKIN: Warm and dry  PSYCH: Awake, alert and appropriately conversant.   Dialysis  access: RUE AVF with good pulsation and thrill             Component Value Date/Time     (L) 01/16/2025 0358     (L) 01/15/2025 0339     09/24/2021 1425     08/20/2021 0646    K 4.1 01/16/2025 0358    K 4.3 01/15/2025 0339    K 3.4 (L) 09/24/2021 1425    K 3.3 (L) 08/20/2021 0646    CO2 22 01/16/2025 0358    CO2 22 01/15/2025 0339    CO2 34 (H) 09/24/2021 1425    CO2 31 08/20/2021 0646    BUN 70.9 (H) 01/16/2025 0358    BUN 51.4 (H) 01/15/2025 0339    BUN 44 (H) 09/24/2021 1425    BUN 45 (H) 08/20/2021 0646    CREATININE 8.27 (H) 01/16/2025 0358    CREATININE 7.16 (H) 01/15/2025 0339    CREATININE 6.22 (H) 09/24/2021 1425    CREATININE 6.81 (H) 08/20/2021 0646    CALCIUM 9.6 01/16/2025 0358    CALCIUM 10.0 01/15/2025 0339    CALCIUM 9.7 05/21/2024 0000    CALCIUM 9.5 09/24/2021 1425    PHOS 5.8 (H) 01/16/2025 0358            Component Value Date/Time    WBC 30.23 (H) 01/16/2025 0358    WBC 32.38 (H) 01/15/2025 0339    WBC 46.88 01/13/2025 0023    HGB 6.5 (L) 01/16/2025 0358    HGB 6.8 (L) 01/15/2025 0339    HGB 11.2 (L) 12/30/2024 0000    HGB 11.6 (L) 12/23/2024 0000    HCT 19.8 (LL) 01/16/2025 0358    HCT 21.1 (L) 01/15/2025 0339     01/16/2025 0358     01/15/2025 0339         Imaging reviewed      Assessment / Plan:       Active Hospital Problems    Diagnosis  POA    *Left leg pain [M79.605]  Unknown    Sepsis [A41.9]  Unknown      Resolved Hospital Problems   No resolved problems to display.       ESRD - normally TTS at [facility]  Anemia chronic disease  Hypertension  Left thigh complex lesion, concerning for malignancy     Plan:  Plan for hemodialysis today on TTS schedule.  Blood counts remain low.  Give 2 unit PRBC with dialysis today.  Start ferrlecit 125 mg daily x 8.  Work up for a left thigh lesion in process.    Darron Green DO  Nephrology  [facility]  Clinic number: [phone]      Note was created with the assistance of electronic Dictation  Services.  Note was reviewed to decrease errors, however, these may still be present.  Please contact me about questions or concerns with the dictation.

## 2025-01-17 LAB
ABS NEUT (OLG): 34.76 X10(3)/MCL (ref 2.1–9.2)
ANION GAP SERPL CALC-SCNC: 15 MEQ/L
ANISOCYTOSIS BLD QL SMEAR: ABNORMAL
AV INDEX (PROSTH): 0.48
AV MEAN GRADIENT: 7 MMHG
AV PEAK GRADIENT: 14 MMHG
AV VELOCITY RATIO: 0.47
BASOPHILS NFR BLD MANUAL: 0.39 X10(3)/MCL (ref 0–0.2)
BASOPHILS NFR BLD MANUAL: 1 %
BSA FOR ECHO PROCEDURE: 2.13 M2
BUN SERPL-MCNC: 51.2 MG/DL (ref 8.9–20.6)
CALCIUM SERPL-MCNC: 9.6 MG/DL (ref 8.4–10.2)
CHLORIDE SERPL-SCNC: 99 MMOL/L (ref 98–107)
CO2 SERPL-SCNC: 20 MMOL/L (ref 22–29)
CREAT SERPL-MCNC: 6.41 MG/DL (ref 0.72–1.25)
CREAT/UREA NIT SERPL: 8
CV ECHO LV RWT: 0.54 CM
DOP CALC AO PEAK VEL: 1.9 M/S
DOP CALC AO VTI: 28.2 CM
DOP CALC LVOT PEAK VEL: 0.9 M/S
DOP CALCLVOT PEAK VEL VTI: 13.5 CM
ECHO LV POSTERIOR WALL: 1.6 CM (ref 0.6–1.1)
EOSINOPHIL NFR BLD MANUAL: 0.39 X10(3)/MCL (ref 0–0.9)
EOSINOPHIL NFR BLD MANUAL: 1 %
ERYTHROCYTE [DISTWIDTH] IN BLOOD BY AUTOMATED COUNT: 18.2 % (ref 11.5–17)
FRACTIONAL SHORTENING: 18.6 % (ref 28–44)
GFR SERPLBLD CREATININE-BSD FMLA CKD-EPI: 10 ML/MIN/1.73/M2
GLUCOSE SERPL-MCNC: 108 MG/DL (ref 74–100)
HCT VFR BLD AUTO: 25.7 % (ref 42–52)
HGB BLD-MCNC: 8.5 G/DL (ref 14–18)
INSTRUMENT WBC (OLG): 38.62 X10(3)/MCL
INTERVENTRICULAR SEPTUM: 1.6 CM (ref 0.6–1.1)
LEFT ATRIUM SIZE: 3.7 CM
LEFT INTERNAL DIMENSION IN SYSTOLE: 4.8 CM (ref 2.1–4)
LEFT VENTRICLE DIASTOLIC VOLUME INDEX: 80.95 ML/M2
LEFT VENTRICLE DIASTOLIC VOLUME: 170 ML
LEFT VENTRICLE MASS INDEX: 217.5 G/M2
LEFT VENTRICLE SYSTOLIC VOLUME INDEX: 50 ML/M2
LEFT VENTRICLE SYSTOLIC VOLUME: 105 ML
LEFT VENTRICULAR INTERNAL DIMENSION IN DIASTOLE: 5.9 CM (ref 3.5–6)
LEFT VENTRICULAR MASS: 456.7 G
LVED V (TEICH): 170 ML
LVES V (TEICH): 105 ML
LVOT MG: 2 MMHG
LVOT MV: 0.55 CM/S
LYMPHOCYTES NFR BLD MANUAL: 1.54 X10(3)/MCL
LYMPHOCYTES NFR BLD MANUAL: 4 %
MACROCYTES BLD QL SMEAR: ABNORMAL
MCH RBC QN AUTO: 32.8 PG (ref 27–31)
MCHC RBC AUTO-ENTMCNC: 33.1 G/DL (ref 33–36)
MCV RBC AUTO: 99.2 FL (ref 80–94)
MICROCYTES BLD QL SMEAR: ABNORMAL
MONOCYTES NFR BLD MANUAL: 1.54 X10(3)/MCL (ref 0.1–1.3)
MONOCYTES NFR BLD MANUAL: 4 %
NEUTROPHILS NFR BLD MANUAL: 90 %
NRBC BLD AUTO-RTO: 0.1 %
PLATELET # BLD AUTO: 385 X10(3)/MCL (ref 130–400)
PLATELET # BLD EST: ABNORMAL 10*3/UL
PMV BLD AUTO: 9.6 FL (ref 7.4–10.4)
POTASSIUM SERPL-SCNC: 4.5 MMOL/L (ref 3.5–5.1)
PV PEAK GRADIENT: 8 MMHG
PV PEAK VELOCITY: 1.44 M/S
RA PRESSURE ESTIMATED: 3 MMHG
RBC # BLD AUTO: 2.59 X10(6)/MCL (ref 4.7–6.1)
RBC MORPH BLD: ABNORMAL
SODIUM SERPL-SCNC: 134 MMOL/L (ref 136–145)
TDI LATERAL: 0.18 M/S
TDI SEPTAL: 0.1 M/S
TDI: 0.14 M/S
TRICUSPID ANNULAR PLANE SYSTOLIC EXCURSION: 2.76 CM
WBC # BLD AUTO: 38.62 X10(3)/MCL (ref 4.5–11.5)
Z-SCORE OF LEFT VENTRICULAR DIMENSION IN END DIASTOLE: -1.01
Z-SCORE OF LEFT VENTRICULAR DIMENSION IN END SYSTOLE: 1.46

## 2025-01-17 PROCEDURE — 21400001 HC TELEMETRY ROOM

## 2025-01-17 PROCEDURE — 63600175 PHARM REV CODE 636 W HCPCS: Performed by: INTERNAL MEDICINE

## 2025-01-17 PROCEDURE — 85025 COMPLETE CBC W/AUTO DIFF WBC: CPT | Performed by: HOSPITALIST

## 2025-01-17 PROCEDURE — 97162 PT EVAL MOD COMPLEX 30 MIN: CPT

## 2025-01-17 PROCEDURE — 25000003 PHARM REV CODE 250: Performed by: INTERNAL MEDICINE

## 2025-01-17 PROCEDURE — 25000003 PHARM REV CODE 250: Performed by: NURSE PRACTITIONER

## 2025-01-17 PROCEDURE — 80048 BASIC METABOLIC PNL TOTAL CA: CPT | Performed by: HOSPITALIST

## 2025-01-17 PROCEDURE — 25000003 PHARM REV CODE 250: Performed by: STUDENT IN AN ORGANIZED HEALTH CARE EDUCATION/TRAINING PROGRAM

## 2025-01-17 PROCEDURE — 36415 COLL VENOUS BLD VENIPUNCTURE: CPT | Performed by: HOSPITALIST

## 2025-01-17 PROCEDURE — 97165 OT EVAL LOW COMPLEX 30 MIN: CPT

## 2025-01-17 PROCEDURE — 99223 1ST HOSP IP/OBS HIGH 75: CPT | Mod: ,,, | Performed by: ORTHOPAEDIC SURGERY

## 2025-01-17 PROCEDURE — 63600175 PHARM REV CODE 636 W HCPCS: Performed by: STUDENT IN AN ORGANIZED HEALTH CARE EDUCATION/TRAINING PROGRAM

## 2025-01-17 PROCEDURE — 63600175 PHARM REV CODE 636 W HCPCS: Performed by: NURSE PRACTITIONER

## 2025-01-17 RX ADMIN — SODIUM CHLORIDE 125 MG: 9 INJECTION, SOLUTION INTRAVENOUS at 10:01

## 2025-01-17 RX ADMIN — CLONIDINE HYDROCHLORIDE 0.1 MG: 0.1 TABLET ORAL at 09:01

## 2025-01-17 RX ADMIN — CARVEDILOL 12.5 MG: 12.5 TABLET, FILM COATED ORAL at 04:01

## 2025-01-17 RX ADMIN — CLONIDINE HYDROCHLORIDE 0.1 MG: 0.1 TABLET ORAL at 08:01

## 2025-01-17 RX ADMIN — MORPHINE SULFATE 4 MG: 4 INJECTION INTRAVENOUS at 08:01

## 2025-01-17 RX ADMIN — HYDROCODONE BITARTRATE AND ACETAMINOPHEN 1 TABLET: 10; 325 TABLET ORAL at 04:01

## 2025-01-17 RX ADMIN — NIFEDIPINE 90 MG: 90 TABLET, FILM COATED, EXTENDED RELEASE ORAL at 09:01

## 2025-01-17 RX ADMIN — CARVEDILOL 12.5 MG: 12.5 TABLET, FILM COATED ORAL at 09:01

## 2025-01-17 RX ADMIN — CINACALCET HYDROCHLORIDE 90 MG: 30 TABLET, FILM COATED ORAL at 09:01

## 2025-01-17 RX ADMIN — HYDROCODONE BITARTRATE AND ACETAMINOPHEN 1 TABLET: 10; 325 TABLET ORAL at 10:01

## 2025-01-17 RX ADMIN — HYDROCODONE BITARTRATE AND ACETAMINOPHEN 1 TABLET: 10; 325 TABLET ORAL at 03:01

## 2025-01-17 RX ADMIN — ASPIRIN 81 MG: 81 TABLET, COATED ORAL at 09:01

## 2025-01-17 RX ADMIN — MUPIROCIN: 20 OINTMENT TOPICAL at 09:01

## 2025-01-17 RX ADMIN — HYDROCODONE BITARTRATE AND ACETAMINOPHEN 1 TABLET: 10; 325 TABLET ORAL at 09:01

## 2025-01-17 NOTE — PT/OT/SLP EVAL
Occupational Therapy   Evaluation and Discharge Note    Name: Mildred Atkins  MRN: [id number]  Recent Surgery: * No surgery found *      Recommendations:     Discharge therapy intensity: No Therapy Indicated   Discharge Equipment Recommendations: none  Barriers to discharge:  None    Assessment:     Mildred Atkins is a 49 y.o. male with a medical diagnosis of aggressive myositis versus muscle necrosis with a central area necrosis of the large mass in the left thigh concerning for possible oncologic pathology versus pseudo tumor, SIRS, NSTEMI. On eval, patient presents with decreased gait stability 2/2 LLE pain, but able to complete BADLs with mod (I). Skilled OT services are not warranted at this time and will defer to PT services addressing mobility needs.    Plan:     OT to sign off as acute OT services are not warranted at this time.  Please re-consult if situation changes during this hospitalization.    Plan of Care Reviewed with: patient    Subjective      Living Environment:  Pt lives with a friend in a ground level apt. T/s combo, no seat. Grab bars in BR  Prior to admission, patients level of function was I.  Equipment used at home: none.  DME owned (not currently used): none.  Upon discharge, patient will have assistance from friend.    Pain/Comfort:  Pain Rating 1:  (verbalized pain L thigh)    Patients cultural, spiritual, Alevism conflicts given the current situation: no    Objective:     OT communicated with RN prior to session.      Patient was found HOB elevated with telemetry, peripheral IV upon OT entry to room.    General Precautions: Standard, fall  Orthopedic Precautions: N/A  Braces: N/A    Vital Signs: Respiratory Status: on room air    Bed Mobility:    SUP<>SIT: INDEP    Functional Mobility/Transfers:  Sit to Stand:  stand by assistance with no AD  Toilet Transfer: stand by assistance with  no AD  using  Step Transfer  Functional Mobility: Amb in room with CGA, no AD. Refer to PT for gait  detail    Activities of Daily Living:  Grooming: independence standing at sink, hang hyg  Toileting: independence +void seated on regular toilet     AMPA 6 Click ADL:  [facility] Total Score: 24    Functional Cognition:  Intact    Visual Perceptual Skills:  Intact    Upper Extremity Function:  Right Upper Extremity:   WFL    Left Upper Extremity:  WFL    Balance:   Impaired. 2/2 LLE pain with WB.     Therapeutic Positioning  Risk for acquired pressure injuries is decreased due to ability to get to BSC/toilet with assist and continence.    OT interventions performed during the course of today's session in an effort to prevent and/or reduce acquired pressure injuries:   Education was provided on benefits of and recommendations for therapeutic positioning    Skin assessment:  all visible skin intact    OT recommendations for therapeutic positioning throughout hospitalization:   Follow [facility] Pressure Injury Prevention Protocol  Geomat recommended for sacral protection while UIC    Patient Education:  Patient provided with verbal education education regarding OT role/goals/POC, fall prevention, safety awareness, and Discharge/DME recommendations.  Understanding was verbalized.     Patient left HOB elevated with all lines intact, call button in reach, RN notified, and ice-packs on L thigh .    History:     No past medical history on file.    No past surgical history on file.    Time Tracking:     OT Date of Treatment: 01/17/25  OT Start Time: 1351  OT Stop Time: 1401  OT Total Time (min): 10 min    Billable Minutes:Evaluation low    1/17/2025

## 2025-01-17 NOTE — CONSULTS
[facility] - 6th Floor Medical Telemetry  Orthopedic Trauma  Consult Note    Patient Name: Mildred Atkins  MRN: [id number]  Admission Date: 1/12/2025  Hospital Length of Stay: 5 days  Attending Provider: David Bull MD  Primary Care Provider: Sharmila, Primary Doctor        Inpatient consult to Orthopedic Surgery  Consult performed by: Alex Varela DO  Consult ordered by: Devin Matos MD        Subjective:         Chief Complaint: No chief complaint on file.       HPI: 49 y.o. male who PMH Includes HTN, CAD, ESRD with HD, anemia, hyperparathyroidism; presents to the ED at [facility] on 1/12/2025 transferred from [facility] for higher level of care and further evaluation of left leg pain complaints. Patient reports he has been having left leg pain off and on over the past 2 week progressively getting worse. Pt reports he started to have swelling which started in his left groin/thigh area. Patient reports he was seen at [facility] ED for this with need to follow-up with his PCP. Patient reports the pain and swelling worsened thus he presented to [facility] ED again. X-ray of the left femur impression reviewed from outside hospital demonstrated no evidence of acute fracture dislocation. Of the left lower extremity done at outside hospital impression reviewed demonstrated extensive soft tissue edema noted, heterogeneous mass versus hematoma noted involving the anterior quadriceps muscle, recommend MRI for further evaluation. Chest x-ray done at outside hospital impression reviewed demonstrated no evidence of acute cardiopulmonary process radiographically.     We are consulted due the left leg mass with a aggressive findings on MRI.    No past medical history on file.    No past surgical history on file.    Review of patient's allergies indicates:  No Known Allergies    Current Facility-Administered Medications   Medication    0.9%  NaCl infusion (for blood administration)    0.9%  NaCl infusion  "(for blood administration)    0.9%  NaCl infusion (for blood administration)    acetaminophen tablet 1,000 mg    aluminum-magnesium hydroxide-simethicone 200-200-20 mg/5 mL suspension 30 mL    aspirin EC tablet 81 mg    bisacodyL suppository 10 mg    carvediloL tablet 12.5 mg    cinacalcet tablet 90 mg    cloNIDine tablet 0.1 mg    dextrose 50% injection 12.5 g    dextrose 50% injection 25 g    ferric gluconate (FERRLECIT) 125 mg in 0.9% NaCl 100 mL IVPB    glucagon (human recombinant) injection 1 mg    glucose chewable tablet 16 g    glucose chewable tablet 24 g    hydrALAZINE injection 10 mg    HYDROcodone-acetaminophen  mg per tablet 1 tablet    melatonin tablet 6 mg    morphine injection 4 mg    mupirocin 2 % ointment    NIFEdipine 24 hr tablet 90 mg    ondansetron injection 4 mg    polyethylene glycol packet 17 g    sodium chloride 0.9% flush 10 mL     Family History    None       Tobacco Use    Smoking status: Not on file    Smokeless tobacco: Not on file   Substance and Sexual Activity    Alcohol use: Not on file    Drug use: Not on file    Sexual activity: Not on file       ROS:  Constitutional: Denies fever chills  Eyes: No change in vision  ENT: No ringing or current infections  CV: No chest pain  Resp: No labored breathing  MSK: Pain evident at site of injury located in HPI,   Integ: No signs of abrasions or lacerations  Neuro: No numbness or tingling  Lymphatic: No swelling outside the area of injury   Objective:     Vital Signs (Most Recent):  Temp: 99.9 °F (37.7 °C) (01/17/25 0844)  Pulse: (!) 133 (01/17/25 0844)  Resp: 16 (01/17/25 0844)  BP: 109/67 (01/17/25 0844)  SpO2: 99 % (01/17/25 0844) Vital Signs (24h Range):  Temp:  [97.8 °F (36.6 °C)-101.2 °F (38.4 °C)] 99.9 °F (37.7 °C)  Pulse:  [] 133  Resp:  [16-21] 16  SpO2:  [98 %-100 %] 99 %  BP: (109-163)/(60-89) 109/67     Weight: 90 kg (198 lb 6.6 oz)  Height: 5' 11" (180.3 cm)  Body mass index is 27.67 " kg/m².      Intake/Output Summary (Last 24 hours) at 1/17/2025 0906  Last data filed at 1/16/2025 1647  Gross per 24 hour   Intake 1440 ml   Output 2850 ml   Net -1410 ml       Ortho/SPM Exam  General the patient is alert and oriented x3 no acute distress nontoxic-appearing appropriate affect.    Constitutional: Vital signs are examined and stable.  Resp: No signs of labored breathing      LLE: -Skin patient does not participate in examination although he does appear to be awake and comfortable.  No signs of erythema drainage or signs of lesions over the area of concern.  No sign of abscess.  No fluctuance.         -MSK:EHL/FHL, Gastroc/Tib anterior Strength 5/5           -Neuro:  Sensation intact to light touch L3-S1 dermatomes           -Lymphatic: No signs of lymphadenopathy           -CV: Capillary refill is less than 2 seconds. DP/PT pulses 2/4. Compartments soft and compressible                          Significant Labs:  I have reviewed all labs in relation to Orthopedics  Recent Lab Results  (Last 5 results in the past 72 hours)        01/17/25  0344   01/16/25  0358   01/15/25  0634   01/15/25  0609   01/15/25  0339        Unit Blood Type Code       5100                9500                9500         Unit Expiration       [id number]                [id number]         Albumin/Globulin Ratio   0.5             ABO and RH     O POS           Albumin   2.0             ALP   110             ALT   22             Anion Gap 15.0   12.0       13.0       Aniso 1+               AST   27             Baso # 0.3862   0.12       0.09       Basophil % 1   0.4       0.3       BILIRUBIN TOTAL   0.4             BUN 51.2   70.9       51.4       BUN/CREAT RATIO 8   9       7       Calcium 9.6   9.6       10.0       Chloride 99   99       100       CO2 20   22       22       Creatinine 6.41   8.27       7.16       Crossmatch Interpretation       Compatible                Compatible                 Compatible         DISPENSE STATUS       Transfused                Transfused                Transfused         eGFR 10   7       9       Eos # 0.3862   0.62       0.14       Eos % 1   2.1       0.4       Globulin, Total   4.3             Glucose 108   112       100       Gran # (ANC) 34.758               Group & Rh       O POS         Hematocrit 25.7   19.8       21.1       Hemoglobin 8.5   6.5       6.8       Immature Grans (Abs)   0.98       0.59       Immature Granulocytes   3.2       1.8       Indirect Purvi GEL       NEG         Lymph # 1.5448   1.10       0.97       LYMPH %   3.6       3.0       Lymphocyte % 4               Macrocytosis 1+               MCH 32.8   33.5       33.3       MCHC 33.1   32.8       32.2       MCV 99.2   102.1       103.4       Microcytosis 1+               Mono # 1.5448   2.09       1.71       Mono % 4   6.9       5.3       MPV 9.6   9.7       10.1       Neut #   25.32       28.88       Neut %   83.8       89.2       Neutrophils Relative 90               nRBC 0.1   0.1       0.0       QRS Duration               OHS QTC Calculation               Phosphorus Level   5.8             Platelet Estimate Increased               Platelet Count 385   359       384       Potassium 4.5   4.1       4.3       Product Code       [id number]                [id number]                [id number]         PROTEIN TOTAL   6.3             PTH   700.4             RBC 2.59   1.94       2.04       RBC Morph Abnormal               RDW 18.2   15.2       12.8       Sodium 134   133       135       Specimen Outdate       01/18/2025 23:59         Unit ABO/Rh       O POS                O NEG                O NEG         UNIT NUMBER       [id number]                [id number]                [id number]         Vancomycin, Random   12.6             WBC 38.62   30.23       32.38        38.62                                      Significant Imaging: I have reviewed all pertinent imaging results/findings.  CT Chest  Abdomen Pelvis With IV Contrast (XPD) NO Oral Contrast    Result Date: 1/16/2025  EXAMINATION: CT CHEST ABDOMEN PELVIS WITH IV CONTRAST (XPD) CLINICAL HISTORY: R/O mets; TECHNIQUE: Low dose axial images, sagittal and coronal reformations were obtained from the thoracic inlet to the pubic symphysis following the IV and oral contrast administration.  Automatic exposure control is utilized to reduce patient radiation exposure. COMPARISON: None available FINDINGS: The lungs are adequately aerated.  There is a cavitary lesion seen in the apex of the right lower lobe.  It measures 2.2 x 2.1 cm.  No other lung lesion is seen..  No pleural thickening is seen.  No pleural effusion is seen.  No infiltrate is seen. The thoracic aorta is normal in caliber.. No mediastinal adenopathy is seen. The heart appears normal in size.. The liver appears normal.  No liver mass or lesion is seen.  Portal and hepatic veins appear normal.. Gallbladder is contracted.  There is a questionable small punctate gallstone in gallbladder. There is evidence of anasarca edema and mesenteric edema. The spleen appears normal.  No splenic mass or lesion is seen. There is evidence of pancreatic duct dilatation seen.  Pancreas is poorly visualized due to the mesenteric edema.  Pancreatic head is poorly visualized.  This is due to mistiming of the contrast bolus as well as the diffuse mesenteric edema. Atherosclerotic plaque is seen throughout the abdominal aorta and mesenteric vessels.. No adrenal abnormality is seen.  No adrenal nodule is seen. The kidneys are well perfused.  No hydronephrosis is seen.  No hydroureter is seen.  No retroperitoneal abnormality is seen.. Visualized portions of the bowel shows no acute abnormality.  No colitis is seen.  No diverticulitis is seen.  No colonic mass is seen. No free air is seen.  No free fluid is seen. Urinary bladder appears unremarkable. There is a large soft tissue mass seen in the anterior thigh on the left  side consistent with patient's history of possible sarcoma.  Diffuse left leg edema is seen.     Cavitary lesion in the apex of the right lower lobe.  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  There is dilatation of the pancreatic duct.  No obvious pancreatic head mass is seen however additional imaging may be beneficial if clinical suspicion remains high Electronically signed by: Dallin Lew Date:    01/16/2025 Time:    11:34    X-Ray Femur 2 View Left    Result Date: 1/15/2025  EXAMINATION XR FEMUR 2 VIEW LEFT CLINICAL HISTORY Swelling; TECHNIQUE A total of 4 images submitted of the left femur. COMPARISON None available at the time of initial interpretation. FINDINGS No displaced fracture or dislocation is identified. The visualized joint spaces are preserved. No aggressive osseous lesion or periosteal reaction is evident. No convincing acute soft tissue abnormality is identified.  There is widespread regional vascular calcification. IMPRESSION 1. No convincing acute abnormality. 2. Widespread vascular calcification. Electronically signed by: Sanju Bynum Date:    01/15/2025 Time:    18:30    MRI Femur W WO Contrast Left    Result Date: 1/14/2025  EXAMINATION: MRI FEMUR W WO CONTRAST LEFT CLINICAL HISTORY: Soft tissue mass, thigh, deep;Patient on HD,  please coordinate with dialysis; TECHNIQUE: Enhanced and unenhanced, multiplanar, multisequence MR imaging of the left thigh was obtained COMPARISON: None FINDINGS: The patient's bone marrow is poorly demonstrated on today's study due to extensive artifact but does appear markedly abnormal.  On the T1 weighted images, bone marrow is hypointense to the  proximal femur and throughout the included portion of the pelvis.  The cortices appear thickened but this is not well demonstrated on today's study either. A 10 cm x 8 cm by 9 cm complex mass is present to the anterior portion of the proximal thigh and primarily involves the rectus femoris and vastus intermedius muscles.  This demonstrates thick nodular peripheral enhancement and a cystic/necrotic central component.  The margin is aggressive involving the quadriceps and adductor muscles. A separate fat containing mass is partially visualized to the medial aspect of the distal thigh and measures at least 8 cm by 5 cm by 3 cm in size.  This demonstrates thin enhancing internal septations and is present just anterior to the distal portion of the semimembranosus muscle.  Marked subcutaneous edema is present to the entire thigh.  The neurovascular structures are poorly demonstrated on today's exam and evaluation is somewhat limited.     A 10 cm x 8 cm by 9 cm complex mass is present to the anterior portion of the proximal thigh and primarily involves the rectus femoris and vastus intermedius muscles. This demonstrates thick nodular peripheral enhancement and a cystic/necrotic central component.  The margin is aggressive involving the quadriceps and adductor muscles.  Primary differential considerations at this point include an aggressive appearing intramuscular abscess with infectious myositis involving the muscles of the anterior thigh and an aggressive soft tissue malignancy which demonstrates central necrosis.  Please correlate with the patient's history as to the need for additional imaging and or referral to Infectious Disease or orthopedic oncology. A separate fat containing mass is partially visualized to the medial aspect of the distal thigh and measures at least 8 cm by 5 cm by 3 cm in size. This demonstrates thin enhancing internal septations and is present just anterior to the distal portion of the  semimembranosus muscle.  The features typically associated with liposarcoma are not well demonstrated on today's study however the majority the lesion is not included in the field of view and may be further evaluated with MRI of the distal thigh/knee if additional imaging is indicated. The bone marrow is poorly demonstrated on today's study but appears abnormal.  This may be further evaluated with bone scan, CT scan, or both depending on the patient's medical history. Electronically signed by: Serjio Hutchison Date:    01/14/2025 Time:    16:12       Assessment/Plan:     Active Diagnoses:    Diagnosis Date Noted POA    PRINCIPAL PROBLEM:  Left leg pain [M79.605] 01/13/2025 Unknown    Sepsis [A41.9] 01/13/2025 Unknown      Problems Resolved During this Admission:       Independent Radiology ordered by other provider:   Agree with MRI findings show aggressive myositis versus muscle necrosis with a central area necrosis of the large mass in the left thigh concerning for possible oncologic pathology versus pseudo tumor.      We have recommended orthopedic oncology management.  [facility] has a established follow up for the patient.  Biopsy scheduled for next week.  I do have concerns about the patient's ability to get to his biopsy and [place] I have reached out to the nurse and  to hopefully address this in a way that allows him to continue treatment [place].  This type of lesion is outside our scope of care and outside our surgical scope of care.  Primary team is aware.    No orthopedic intervention planned.  Further treatment in [facility] oncology.          This note/OR report was created with the assistance of  voice recognition software or phone  dictation.  There may be transcription errors as a result of using this technology however minimal. Effort has been made to assure accuracy of transcription but any obvious errors or omissions should be clarified with the author of the  document.       Alex Varela DO   Orthopedic Trauma Surgery  [facility] - 6th Floor Medical Telemetry

## 2025-01-17 NOTE — CARE UPDATE
[id number] Pt has an appt with Dr Eduard Ward at [facility] in [place] on 1/22/25 @1:00pm. Called ana to see if pt has transportation benefits since pt is having trouble getting a ride to his appt. Pt does have transportation benefits. Need to call the member service line with ana at [phone]. Called member service line to arrange transportation for pt for his appt with Dr Ward. Called member service line and [place] with ana reports pt has to call to arrange transportation for his appt in [place]. I am unable to do it. .

## 2025-01-17 NOTE — PROGRESS NOTES
Nephrology consult follow up note    HPI:      Mildred Atkins is a 49 y.o. male Who presents with left leg pain and swelling. PMH significant for ESRD on HD TTS at [facility] [place], hypertension, coronary artery disease, anemia, and secondary hyperparathyroidism. Patient presented as a transfer from outside hospital due to left leg pain and swelling. He has been having left leg pain and swelling for about 2 weeks. After admission he has started on antibiotics for presumed sepsis. Patient also has severe leukocytosis. Nephrology consulted for ESRD management.  Workup for left thigh swelling concerning for malignant sarcoma.    Interval history:     01/14/2025   He did have MRI done today.  Results are pending.  No new complaints.  Waiting to go for dialysis today.  No chest pain no abdominal pains.  No shortness of breath.  Patient examined again at 2:00 p.m. while on dialysis.     01/15/2025   MRI results noted.  It looks like he will be going to ortho oncology in [place] or [facility].  Sitting up in the bed and has no new complaints.    01/16/2025  No acute events overnight.  No new complaints.  Continues to have left thigh swelling.  No chest pain, shortness of breath, nausea vomiting.    01/17/2025  No acute events overnight.  No new complaints.  Hemoglobin improved with PRBC transfusion yesterday.  Tolerated dialysis yesterday without problem.  No chest pain, shortness of breath, nausea, vomiting.     Review of Systems:     Comprehensive 10pt ROS negative except as noted per history.    Past medical, family, surgical, and social history reviewed and unchanged from initial consult note.     Objective:       VITAL SIGNS: 24 HR MIN & MAX LAST    Temp  Min: 98.2 °F (36.8 °C)  Max: 101.2 °F (38.4 °C)  99.9 °F (37.7 °C)        BP  Min: 109/67  Max: 163/87  109/67     Pulse  Min: 78  Max: 148  (!) 133     Resp  Min: 16  Max: 21  16    SpO2  Min: 98 %  Max: 100 %  99 %      GEN:  Well-appearing AA male  CV: RRR  +S1,S2 without murmur  PULM: CTAB, unlabored  ABD: Soft, NT/ND abdomen with NABS  EXT: No cyanosis or edema.  Swelling of left thigh  SKIN: Warm and dry  PSYCH: Awake, alert and appropriately conversant.   Dialysis access: RUE AVF             Component Value Date/Time     (L) 01/17/2025 0344     (L) 01/16/2025 0358     09/24/2021 1425     08/20/2021 0646    K 4.5 01/17/2025 0344    K 4.1 01/16/2025 0358    K 3.4 (L) 09/24/2021 1425    K 3.3 (L) 08/20/2021 0646    CO2 20 (L) 01/17/2025 0344    CO2 22 01/16/2025 0358    CO2 34 (H) 09/24/2021 1425    CO2 31 08/20/2021 0646    BUN 51.2 (H) 01/17/2025 0344    BUN 70.9 (H) 01/16/2025 0358    BUN 44 (H) 09/24/2021 1425    BUN 45 (H) 08/20/2021 0646    CREATININE 6.41 (H) 01/17/2025 0344    CREATININE 8.27 (H) 01/16/2025 0358    CREATININE 6.22 (H) 09/24/2021 1425    CREATININE 6.81 (H) 08/20/2021 0646    CALCIUM 9.6 01/17/2025 0344    CALCIUM 9.6 01/16/2025 0358    CALCIUM 9.7 05/21/2024 0000    CALCIUM 9.5 09/24/2021 1425    PHOS 5.8 (H) 01/16/2025 0358            Component Value Date/Time    WBC 38.62 (H) 01/17/2025 0344    WBC 38.62 01/17/2025 0344    WBC 30.23 (H) 01/16/2025 0358    WBC 46.88 01/13/2025 0023    HGB 8.5 (L) 01/17/2025 0344    HGB 6.5 (L) 01/16/2025 0358    HGB 11.2 (L) 12/30/2024 0000    HGB 11.6 (L) 12/23/2024 0000    HCT 25.7 (L) 01/17/2025 0344    HCT 19.8 (LL) 01/16/2025 0358     01/17/2025 0344     01/16/2025 0358         Imaging reviewed      Assessment / Plan:       Active Hospital Problems    Diagnosis  POA    *Left leg pain [M79.605]  Unknown    Sepsis [A41.9]  Unknown      Resolved Hospital Problems   No resolved problems to display.       ESRD - normally TTS at [facility]  Anemia chronic disease  Hypertension  Left thigh complex lesion, concerning for malignancy     Plan:  No acute indication for off schedule hemodialysis today.  Plan to dialyze tomorrow on TTS schedule.    Darron Green,  DO  Nephrology  [facility]  Clinic number: [phone]      Note was created with the assistance of electronic Dictation Services.  Note was reviewed to decrease errors, however, these may still be present.  Please contact me about questions or concerns with the dictation.

## 2025-01-17 NOTE — PLAN OF CARE
Problem: Physical Therapy  Goal: Physical Therapy Goal  Description: Goals to be met by: d/c     Patient will increase functional independence with mobility by performin. Sit to stand transfer with Modified [place]  2. Bed to chair transfer with Modified [place] using Least Restrictive Assistive Device  3. Gait  x 100 feet with Modified [place] using Least Restrictive Assistive Device.     Outcome: Progressing

## 2025-01-17 NOTE — PROGRESS NOTES
[facility] Hospital Medicine Progress Note        Chief Complaint: Inpatient Follow-up     HPI:   49 y.o. male who PMH Includes HTN, CAD, ESRD with HD, anemia, hyperparathyroidism; presents to the ED at [facility] on 1/12/2025 transferred from [facility] for higher level of care and further evaluation of left leg pain complaints.  Patient reports he has been having left leg pain off and on over the past  2 week progressively getting worse. Pt reports he started to have swelling which started in his left groin/thigh area.  [facility] need to follow-up with his PCP.  Patient reports the pain and swelling worsened thus he presented to [facility] ED again.  X-ray of the left femur impression reviewed from outside hospital demonstrated no evidence of acute fracture dislocation.  Of the left lower extremity done at outside hospital impression reviewed demonstrated extensive soft tissue edema noted, heterogeneous mass versus hematoma noted involving the anterior quadriceps muscle, recommend MRI for further evaluation.  Chest x-ray done at outside hospital impression reviewed demonstrated no evidence of acute cardiopulmonary process radiographically.    Initial vital signs /92 pulse 136 respirations 20 temperature 102.2° F O2 saturation 98% on room air.  Patient did have blood cultures collected at OSH.  Patient was started on IV vancomycin and Zosyn therapy.  At the time of rounds patient's heart rate had trended down to 110's. Pt denies any CP, SOB, N/V/D.      Patient was started on broad spectrum antibiotics. HD started per renal team. Patients h&h dropped and he was transfused 1 unit of PRBC. Since the swelling was firm and non mobile there was a high suspicion for an infected hematoma vs a sarcoma. 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 Seen by ortho team and recommended transfer to hospital with ortho oncology  service. CM notified.      Patient and family notified and they are ok with the plan.     Interval Hx:   New in a without any new changes overnight.  Resting comfortably in bed.  Pain is controlled.  With the dialysis yesterday without issue.  Pulses little high this morning but otherwise stable.  He did have a acute increase in his white count.  Currently off of antibiotics.  His hemoglobin is better.  Afebrile.     Objective/physical exam:  General: In no acute distress  Chest: Clear to auscultation bilaterally  Heart: RRR, +S1, S2, no appreciable murmur  Abdomen: Soft, nontender, BS +  MSK: + left thigh firm swelling and non mobile, mild tenderness   Neurologic: Alert and oriented x4, Cranial nerve II-XII intact, Strength 5/5 in all 4 extremities    VITAL SIGNS: 24 HRS MIN & MAX LAST   Temp  Min: 97.8 °F (36.6 °C)  Max: 101.2 °F (38.4 °C) 99.3 °F (37.4 °C)   BP  Min: 120/60  Max: 163/87 120/60   Pulse  Min: 78  Max: 148  (!) 135   Resp  Min: 17  Max: 21 (!) 21   SpO2  Min: 98 %  Max: 100 % 99 %       Recent Labs   Lab 01/15/25  0339 01/16/25  0358 01/17/25  0344   WBC 32.38* 30.23* 38.62  38.62*   RBC 2.04* 1.94* 2.59*   HGB 6.8* 6.5* 8.5*   HCT 21.1* 19.8* 25.7*   .4* 102.1* 99.2*   MCH 33.3* 33.5* 32.8*   MCHC 32.2* 32.8* 33.1   RDW 12.8 15.2 18.2*    359 385   MPV 10.1 9.7 9.6       Recent Labs   Lab 01/13/25  0023 01/14/25  0658 01/15/25  0339 01/16/25  0358 01/17/25  0344    134* 135* 133* 134*   K 3.8 4.1 4.3 4.1 4.5    98 100 99 99   CO2 22 21* 22 22 20*   BUN 59.3* 80.6* 51.4* 70.9* 51.2*   CREATININE 6.92* 9.33* 7.16* 8.27* 6.41*   CALCIUM 10.2 10.1 10.0 9.6 9.6   MG 1.90  --   --   --   --    ALBUMIN 2.2* 2.2*  --  2.0*  --    ALKPHOS 88 88  --  110  --    ALT 15 15  --  22  --    AST 15 14  --  27  --    BILITOT 0.4 0.4  --  0.4  --           Microbiology Results (last 7 days)       Procedure Component Value Units Date/Time    Blood Culture [[id number]]  (Normal) Collected:  01/14/25 1752    Order Status: Completed Specimen: Blood Updated: 01/16/25 2002     Blood Culture No Growth At 48 Hours    Blood Culture [[id number]]  (Normal) Collected: 01/14/25 1752    Order Status: Completed Specimen: Blood Updated: 01/16/25 2002     Blood Culture No Growth At 48 Hours             Radiology:  CT Chest Abdomen Pelvis With IV Contrast (XPD) NO Oral Contrast  Narrative: EXAMINATION:  CT CHEST ABDOMEN PELVIS WITH IV CONTRAST (XPD)    CLINICAL HISTORY:  R/O mets;    TECHNIQUE:  Low dose axial images, sagittal and coronal reformations were obtained from the thoracic inlet to the pubic symphysis following the IV and oral contrast administration.  Automatic exposure control is utilized to reduce patient radiation exposure.    COMPARISON:  None available    FINDINGS:  The lungs are adequately aerated.  There is a cavitary lesion seen in the apex of the right lower lobe.  It measures 2.2 x 2.1 cm.  No other lung lesion is seen..  No pleural thickening is seen.  No pleural effusion is seen.  No infiltrate is seen.    The thoracic aorta is normal in caliber..    No mediastinal adenopathy is seen.    The heart appears normal in size..    The liver appears normal.  No liver mass or lesion is seen.  Portal and hepatic veins appear normal..    Gallbladder is contracted.  There is a questionable small punctate gallstone in gallbladder.    There is evidence of anasarca edema and mesenteric edema.    The spleen appears normal.  No splenic mass or lesion is seen.    There is evidence of pancreatic duct dilatation seen.  Pancreas is poorly visualized due to the mesenteric edema.  Pancreatic head is poorly visualized.  This is due to mistiming of the contrast bolus as well as the diffuse mesenteric edema.    Atherosclerotic plaque is seen throughout the abdominal aorta and mesenteric vessels..    No adrenal abnormality is seen.  No adrenal nodule is seen.    The kidneys are well perfused.  No hydronephrosis is seen.   No hydroureter is seen.  No retroperitoneal abnormality is seen..    Visualized portions of the bowel shows no acute abnormality.  No colitis is seen.  No diverticulitis is seen.  No colonic mass is seen.    No free air is seen.  No free fluid is seen.    Urinary bladder appears unremarkable.    There is a large soft tissue mass seen in the anterior thigh on the left side consistent with patient's history of possible sarcoma.  Diffuse left leg edema is seen.  Impression: Cavitary lesion in the apex of the right lower lobe.  Findings could represent inflammatory versus neoplastic process    Large soft tissue mass in the anterior left thigh consistent with patient's history of possible soft tissue sarcoma    Diffuse anasarca edema and some mesenteric edema    Pancreatic head not well visualized on this examination due to mistiming of the contrast bolus as well as the mesenteric edema.  There is dilatation of the pancreatic duct.  No obvious pancreatic head mass is seen however additional imaging may be beneficial if clinical suspicion remains high    Electronically signed by: Dallin Lew  Date:    01/16/2025  Time:    11:34        Medications:  Scheduled Meds:   aspirin  81 mg Oral Daily    carvediloL  12.5 mg Oral BID WM    cinacalcet  90 mg Oral Daily with breakfast    cloNIDine  0.1 mg Oral BID    ferric gluconate (FERRLECIT) 125 mg in 0.9% NaCl 100 mL IVPB  125 mg Intravenous Daily    mupirocin   Nasal BID    NIFEdipine  90 mg Oral Daily     Continuous Infusions:  PRN Meds:.  Current Facility-Administered Medications:     0.9%  NaCl infusion (for blood administration), , Intravenous, Q24H PRN    0.9%  NaCl infusion (for blood administration), , Intravenous, Q24H PRN    0.9%  NaCl infusion (for blood administration), , Intravenous, Q24H PRN    acetaminophen, 1,000 mg, Oral, Q6H PRN    aluminum-magnesium hydroxide-simethicone, 30 mL, Oral, QID PRN    bisacodyL, 10 mg, Rectal, Daily PRN    dextrose 50%, 12.5 g,  Intravenous, PRN    dextrose 50%, 25 g, Intravenous, PRN    glucagon (human recombinant), 1 mg, Intramuscular, PRN    glucose, 16 g, Oral, PRN    glucose, 24 g, Oral, PRN    hydrALAZINE, 10 mg, Intravenous, Q2H PRN    HYDROcodone-acetaminophen, 1 tablet, Oral, Q6H PRN    melatonin, 6 mg, Oral, Nightly PRN    morphine, 4 mg, Intravenous, Q4H PRN    ondansetron, 4 mg, Intravenous, Q4H PRN    polyethylene glycol, 17 g, Oral, BID PRN    sodium chloride 0.9%, 10 mL, Intravenous, PRN    Nutrition:  Nutrition consulted. Most recent weight and BMI monitored-     Measurements:  Wt Readings from Last 1 Encounters:   01/15/25 90 kg (198 lb 6.6 oz)   Body mass index is 27.67 kg/m².    Patient has been screened and assessed by RD.    Malnutrition Type:  Context:    Level:      Malnutrition Characteristic Summary:       Interventions/Recommendations (treatment strategy):           Assessment/Plan:   Left thigh sarcoma   SIRS, no sepsis   NSTEMI- type 2 from sepsis   Anemia- chronic disease, ? Acute blood loss in the left thigh mass   HTN- urgency  ESRD with HD     Outpatient left lower extremity biopsies scheduled for 1/22 at 1:00 p.m. in [place].    Plan to optimize the patient over the weekend and possibly discharge tomorrow or Sunday.    He was transfused 2 units of packed red blood cells yesterday and started on IV iron.  Hemoglobin is better this morning.  Will like him to get it least 3 days of iron prior to discharging and then will placed on oral supplementation.  PT and OT attempted to evaluate yesterday but held off due to anemia.  Will see how he does today.  Hopefully can just go home with some home health.  Vital signs are stable.    He is currently off of antibiotics.  We did get report yesterday saying positive blood cultures but remains negative in the computer.  I called microbiology and confirmed at blood cultures are negative so far.  Will keep him off antibiotics for now.  Suspect his white count is  secondary to likely malignancy          Daivd Bull MD   01/17/2025     All diagnosis and differential diagnosis have been reviewed; assessment and plan has been documented; I have personally reviewed the labs and test results that are presently available; I have reviewed the patients medication list; I have reviewed the consulting providers response and recommendations. I have reviewed or attempted to review medical records based upon their availability    All of the patient's questions have been  addressed and answered. Patient's is agreeable to the above stated plan. I will continue to monitor closely and make adjustments to medical management as needed.  _____________________________________________________________________

## 2025-01-17 NOTE — PT/OT/SLP EVAL
Physical Therapy Evaluation    Patient Name:  Mildred Atkins   MRN:  [id number]    Recommendations:     Discharge therapy intensity: No Therapy Indicated   Discharge Equipment Recommendations:  (4ww vs RW pending pt preference)   Barriers to discharge: Ongoing medical needs    Assessment:     Mildred Atkins is a 49 y.o. male admitted with a medical diagnosis of aggressive myositis versus muscle necrosis with a central area necrosis of the large mass in the left thigh concerning for possible oncologic pathology versus pseudo tumor, SIRS, NSTEMI.  He presents with the following impairments/functional limitations: weakness, impaired balance, gait instability, decreased lower extremity function. Pt tolerated session fair, pt reports he has been ambulatory to/from commode since hospitalization. Increased L hip pain resulting in antalgic stepping pattern with WBOS, furniture grabbing for stability. Would benefit from 4ww vs RW for energy conservation, pain management, and to prevent fall risk.     Rehab Prognosis: Good; patient would benefit from acute skilled PT services to address these deficits and reach maximum level of function.    Recent Surgery: * No surgery found *      Plan:     During this hospitalization, patient would benefit from acute PT services 3 x/week to address the identified rehab impairments via gait training, therapeutic activities, therapeutic exercises and progress toward the following goals:    Plan of Care Expires:  02/15/25    Subjective     Chief Complaint: L thigh pain/weakness  Patient/Family Comments/goals: to return to PLOF  Pain/Comfort:  Pain Rating 1:  (not rated, but verbalizing L thigh pain)    Patients cultural, spiritual, Spiritism conflicts given the current situation:      Living Environment:  Pt lives with a friend in a ground level apt.  Prior to admission, patients level of function was I.  Equipment used at home: none.  DME owned (not currently used): none.  Upon discharge,  patient will have assistance from friend.    Objective:     Communicated with RN prior to session.  Patient found HOB elevated with peripheral IV  upon PT entry to room.    General Precautions: Standard,    Orthopedic Precautions:N/A   Braces: N/A  Respiratory Status: Room air    Exams:  RLE Strength: WFL  LLE Strength: N/T 2/2 pain  Skin integrity:  swelling to L thigh      Functional Mobility:  Bed Mobility:     Supine to Sit: independence  Sit to Supine: independence  Transfers:     Sit to Stand:  stand by assistance with no AD  Toilet Transfer: stand by assistance with  no AD  using  Step Transfer  Gait: Pt ambulated to/from bathroom without AD CGA. Significant antalgic stepping pattern with hip drop and anterior trunk flexion. Decreased step length, unsteadiness present.       AM-PAC 6 CLICK MOBILITY  Total Score:23       Treatment & Education:    Patient provided with verbal education education regarding PT role/goals/POC, fall prevention, and safety awareness.  Understanding was verbalized.     Patient left HOB elevated with all lines intact and call button in reach.    GOALS:   Multidisciplinary Problems       Physical Therapy Goals          Problem: Physical Therapy    Goal Priority Disciplines Outcome Interventions   Physical Therapy Goal     PT, PT/OT Progressing    Description: Goals to be met by: d/c     Patient will increase functional independence with mobility by performin. Sit to stand transfer with Modified Day  2. Bed to chair transfer with Modified Day using Least Restrictive Assistive Device  3. Gait  x 100 feet with Modified Day using Least Restrictive Assistive Device.                          History:     No past medical history on file.    No past surgical history on file.    Time Tracking:     PT Received On: 25  PT Start Time: 1351     PT Stop Time: 1400  PT Total Time (min): 9 min     Billable Minutes: Evaluation 9      2025

## 2025-01-18 LAB
ANION GAP SERPL CALC-SCNC: 14 MEQ/L
BASOPHILS # BLD AUTO: 0.13 X10(3)/MCL
BASOPHILS NFR BLD AUTO: 0.3 %
BUN SERPL-MCNC: 70.4 MG/DL (ref 8.9–20.6)
CALCIUM SERPL-MCNC: 9.2 MG/DL (ref 8.4–10.2)
CHLORIDE SERPL-SCNC: 100 MMOL/L (ref 98–107)
CO2 SERPL-SCNC: 17 MMOL/L (ref 22–29)
CREAT SERPL-MCNC: 7.85 MG/DL (ref 0.72–1.25)
CREAT/UREA NIT SERPL: 9
EOSINOPHIL # BLD AUTO: 0.49 X10(3)/MCL (ref 0–0.9)
EOSINOPHIL NFR BLD AUTO: 1.2 %
ERYTHROCYTE [DISTWIDTH] IN BLOOD BY AUTOMATED COUNT: 17.3 % (ref 11.5–17)
GFR SERPLBLD CREATININE-BSD FMLA CKD-EPI: 8 ML/MIN/1.73/M2
GLUCOSE SERPL-MCNC: 105 MG/DL (ref 74–100)
HCT VFR BLD AUTO: 25.5 % (ref 42–52)
HGB BLD-MCNC: 8.3 G/DL (ref 14–18)
IMM GRANULOCYTES # BLD AUTO: 1.53 X10(3)/MCL (ref 0–0.04)
IMM GRANULOCYTES NFR BLD AUTO: 3.6 %
LYMPHOCYTES # BLD AUTO: 1.75 X10(3)/MCL (ref 0.6–4.6)
LYMPHOCYTES NFR BLD AUTO: 4.2 %
MCH RBC QN AUTO: 32.8 PG (ref 27–31)
MCHC RBC AUTO-ENTMCNC: 32.5 G/DL (ref 33–36)
MCV RBC AUTO: 100.8 FL (ref 80–94)
MONOCYTES # BLD AUTO: 4.17 X10(3)/MCL (ref 0.1–1.3)
MONOCYTES NFR BLD AUTO: 9.9 %
NEUTROPHILS # BLD AUTO: 33.87 X10(3)/MCL (ref 2.1–9.2)
NEUTROPHILS NFR BLD AUTO: 80.8 %
NRBC BLD AUTO-RTO: 0 %
PLATELET # BLD AUTO: 367 X10(3)/MCL (ref 130–400)
PMV BLD AUTO: 9.5 FL (ref 7.4–10.4)
POTASSIUM SERPL-SCNC: 4.7 MMOL/L (ref 3.5–5.1)
RBC # BLD AUTO: 2.53 X10(6)/MCL (ref 4.7–6.1)
SODIUM SERPL-SCNC: 131 MMOL/L (ref 136–145)
WBC # BLD AUTO: 41.94 X10(3)/MCL (ref 4.5–11.5)

## 2025-01-18 PROCEDURE — 63600175 PHARM REV CODE 636 W HCPCS: Performed by: STUDENT IN AN ORGANIZED HEALTH CARE EDUCATION/TRAINING PROGRAM

## 2025-01-18 PROCEDURE — 36415 COLL VENOUS BLD VENIPUNCTURE: CPT | Performed by: HOSPITALIST

## 2025-01-18 PROCEDURE — 63600175 PHARM REV CODE 636 W HCPCS: Performed by: INTERNAL MEDICINE

## 2025-01-18 PROCEDURE — 85025 COMPLETE CBC W/AUTO DIFF WBC: CPT | Performed by: HOSPITALIST

## 2025-01-18 PROCEDURE — 25000003 PHARM REV CODE 250: Performed by: NURSE PRACTITIONER

## 2025-01-18 PROCEDURE — 25000003 PHARM REV CODE 250: Performed by: INTERNAL MEDICINE

## 2025-01-18 PROCEDURE — 25000003 PHARM REV CODE 250: Performed by: STUDENT IN AN ORGANIZED HEALTH CARE EDUCATION/TRAINING PROGRAM

## 2025-01-18 PROCEDURE — 80100016 HC MAINTENANCE HEMODIALYSIS

## 2025-01-18 PROCEDURE — 80048 BASIC METABOLIC PNL TOTAL CA: CPT | Performed by: HOSPITALIST

## 2025-01-18 PROCEDURE — 21400001 HC TELEMETRY ROOM

## 2025-01-18 RX ADMIN — SODIUM CHLORIDE 125 MG: 9 INJECTION, SOLUTION INTRAVENOUS at 08:01

## 2025-01-18 RX ADMIN — CLONIDINE HYDROCHLORIDE 0.1 MG: 0.1 TABLET ORAL at 08:01

## 2025-01-18 RX ADMIN — ASPIRIN 81 MG: 81 TABLET, COATED ORAL at 08:01

## 2025-01-18 RX ADMIN — NIFEDIPINE 90 MG: 90 TABLET, FILM COATED, EXTENDED RELEASE ORAL at 08:01

## 2025-01-18 RX ADMIN — HYDROCODONE BITARTRATE AND ACETAMINOPHEN 1 TABLET: 10; 325 TABLET ORAL at 04:01

## 2025-01-18 RX ADMIN — CINACALCET HYDROCHLORIDE 90 MG: 30 TABLET, FILM COATED ORAL at 08:01

## 2025-01-18 RX ADMIN — CARVEDILOL 12.5 MG: 12.5 TABLET, FILM COATED ORAL at 08:01

## 2025-01-18 RX ADMIN — HYDROCODONE BITARTRATE AND ACETAMINOPHEN 1 TABLET: 10; 325 TABLET ORAL at 10:01

## 2025-01-18 RX ADMIN — CARVEDILOL 12.5 MG: 12.5 TABLET, FILM COATED ORAL at 06:01

## 2025-01-18 NOTE — PROGRESS NOTES
[facility] Hospital Medicine Progress Note        Chief Complaint: Inpatient Follow-up     HPI:   49 y.o. male who PMH Includes HTN, CAD, ESRD with HD, anemia, hyperparathyroidism; presents to the ED at [facility] on 1/12/2025 transferred from [facility] for higher level of care and further evaluation of left leg pain complaints.  Patient reports he has been having left leg pain off and on over the past  2 week progressively getting worse. Pt reports he started to have swelling which started in his left groin/thigh area.  [facility] with need to follow-up with his PCP.  Patient reports the pain and swelling worsened thus he presented to [facility] ED again.  X-ray of the left femur impression reviewed from outside hospital demonstrated no evidence of acute fracture dislocation.  Of the left lower extremity done at outside hospital impression reviewed demonstrated extensive soft tissue edema noted, heterogeneous mass versus hematoma noted involving the anterior quadriceps muscle, recommend MRI for further evaluation.  Chest x-ray done at outside hospital impression reviewed demonstrated no evidence of acute cardiopulmonary process radiographically.    Initial vital signs /92 pulse 136 respirations 20 temperature 102.2° F O2 saturation 98% on room air.  Patient did have blood cultures collected at OSH.  Patient was started on IV vancomycin and Zosyn therapy.  At the time of rounds patient's heart rate had trended down to 110's. Pt denies any CP, SOB, N/V/D.      Patient was started on broad spectrum antibiotics. HD started per renal team. Patients h&h dropped and he was transfused 1 unit of PRBC. Since the swelling was firm and non mobile there was a high suspicion for an infected hematoma vs a sarcoma. 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 Seen by ortho team and recommended transfer to hospital with ortho oncology  service. CM notified.  Hospitalist group was able to speak with orthopedic oncology in [place].  The recommended getting the biopsy done here as it will take 2-4 weeks to returned.  Unfortunately we do not have IR services available for the next couple weeks.  It was set up as an outpatient on 01/22 at 1:00 p.m. in [place] with Dr. Kyle's office.  Case management was also able to verify patient has transportation benefits.  He needs to call at least 24 hours before the appointment.     Interval Hx:   No significant changes.  Vital signs stable overnight.  Hemoglobin unchanged.  8.3 this morning.  His white count has been increasing but does not appear to be septic.  No fevers.  Again suspect that this is from his sarcoma he is currently off of antibiotics     Objective/physical exam:  General: In no acute distress  Chest: Clear to auscultation bilaterally  Heart: RRR, +S1, S2, no appreciable murmur  Abdomen: Soft, nontender, BS +  MSK: + left thigh firm swelling and non mobile, mild tenderness   Neurologic: Alert and oriented x4, Cranial nerve II-XII intact, Strength 5/5 in all 4 extremities       VITAL SIGNS: 24 HRS MIN & MAX LAST   Temp  Min: 97.4 °F (36.3 °C)  Max: 99.9 °F (37.7 °C) 99 °F (37.2 °C)   BP  Min: 101/64  Max: 156/83 129/71   Pulse  Min: 97  Max: 133  (!) 113   Resp  Min: 16  Max: 20 20   SpO2  Min: 97 %  Max: 100 % 100 %       Recent Labs   Lab 01/16/25  0358 01/17/25  0344 01/18/25  0417   WBC 30.23* 38.62  38.62* 41.94*   RBC 1.94* 2.59* 2.53*   HGB 6.5* 8.5* 8.3*   HCT 19.8* 25.7* 25.5*   .1* 99.2* 100.8*   MCH 33.5* 32.8* 32.8*   MCHC 32.8* 33.1 32.5*   RDW 15.2 18.2* 17.3*    385 367   MPV 9.7 9.6 9.5       Recent Labs   Lab 01/13/25  0023 01/14/25  0658 01/15/25  0339 01/16/25  0358 01/17/25  0344 01/18/25  0417    134*   < > 133* 134* 131*   K 3.8 4.1   < > 4.1 4.5 4.7    98   < > 99 99 100   CO2 22 21*   < > 22 20* 17*   BUN 59.3* 80.6*   < > 70.9* 51.2*  70.4*   CREATININE 6.92* 9.33*   < > 8.27* 6.41* 7.85*   CALCIUM 10.2 10.1   < > 9.6 9.6 9.2   MG 1.90  --   --   --   --   --    ALBUMIN 2.2* 2.2*  --  2.0*  --   --    ALKPHOS 88 88  --  110  --   --    ALT 15 15  --  22  --   --    AST 15 14  --  27  --   --    BILITOT 0.4 0.4  --  0.4  --   --     < > = values in this interval not displayed.          Microbiology Results (last 7 days)       Procedure Component Value Units Date/Time    Blood Culture [[id number]]  (Normal) Collected: 01/14/25 1752    Order Status: Completed Specimen: Blood Updated: 01/17/25 2002     Blood Culture No Growth At 72 Hours    Blood Culture [[id number]]  (Normal) Collected: 01/14/25 1752    Order Status: Completed Specimen: Blood Updated: 01/17/25 2002     Blood Culture No Growth At 72 Hours             Radiology:  Echo    Left Ventricle: The left ventricle is moderately dilated. Severely   increased wall thickness. Global hypokinesis present. There is moderately   reduced systolic function with a visually estimated ejection fraction of   35 - 40%.    Right Ventricle: Normal right ventricular cavity size. Systolic   function is normal.    Aortic Valve: There is mild aortic valve sclerosis. There is normal   leaflet mobility. There is no stenosis. Aortic valve peak velocity is 1.9   m/s. Mean gradient is 7 mmHg. There is no significant regurgitation.    Mitral Valve: Mildly thickened leaflets. There is no significant   regurgitation.    Tricuspid Valve: There is mild regurgitation.    IVC/SVC: Normal venous pressure at 3 mmHg.    Pericardium: There is a small circumferential effusion. No indication   of cardiac tamponade. Evidence includes no IVC dilation, no chamber   collapse, no respiratory chamber variation.        Medications:  Scheduled Meds:   aspirin  81 mg Oral Daily    carvediloL  12.5 mg Oral BID WM    cinacalcet  90 mg Oral Daily with breakfast    cloNIDine  0.1 mg Oral BID    ferric gluconate (FERRLECIT) 125 mg in 0.9% NaCl  100 mL IVPB  125 mg Intravenous Daily    NIFEdipine  90 mg Oral Daily     Continuous Infusions:  PRN Meds:.  Current Facility-Administered Medications:     0.9%  NaCl infusion (for blood administration), , Intravenous, Q24H PRN    0.9%  NaCl infusion (for blood administration), , Intravenous, Q24H PRN    0.9%  NaCl infusion (for blood administration), , Intravenous, Q24H PRN    acetaminophen, 1,000 mg, Oral, Q6H PRN    aluminum-magnesium hydroxide-simethicone, 30 mL, Oral, QID PRN    bisacodyL, 10 mg, Rectal, Daily PRN    dextrose 50%, 12.5 g, Intravenous, PRN    dextrose 50%, 25 g, Intravenous, PRN    glucagon (human recombinant), 1 mg, Intramuscular, PRN    glucose, 16 g, Oral, PRN    glucose, 24 g, Oral, PRN    hydrALAZINE, 10 mg, Intravenous, Q2H PRN    HYDROcodone-acetaminophen, 1 tablet, Oral, Q6H PRN    melatonin, 6 mg, Oral, Nightly PRN    morphine, 4 mg, Intravenous, Q4H PRN    ondansetron, 4 mg, Intravenous, Q4H PRN    polyethylene glycol, 17 g, Oral, BID PRN    sodium chloride 0.9%, 10 mL, Intravenous, PRN    Nutrition:  Nutrition consulted. Most recent weight and BMI monitored-     Measurements:  Wt Readings from Last 1 Encounters:   01/15/25 90 kg (198 lb 6.6 oz)   Body mass index is 27.67 kg/m².    Patient has been screened and assessed by RD.    Malnutrition Type:  Context:    Level:      Malnutrition Characteristic Summary:       Interventions/Recommendations (treatment strategy):           Assessment/Plan:   Left thigh sarcoma   Malignancy related leukocytosis  NSTEMI- type 2   Anemia of chronic disease   HTN- urgency  ESRD with HD    Outpatient left lower extremity biopsies scheduled for 1/22 at 1:00 p.m. in [place].  Case management verified that he does have transportation benefits through Medicare.  Patient needs to call to set it up.  We can not arrange transportation for him.  Day 3 of [facility].  Hemoglobin stable.  Will continue while inpatient.  Continue leukocytosis.  Still climbing  little bit but afebrile without any signs of infection.  Off of antibiotics.  Suspect this is secondary to his sarcoma.  Will continue to monitor.  Previous cultures all negative.  PT and OT evaluated yesterday.  No therapy indicated  Nephrology following for HD management.  Tentatively plan for discharge tomorrow if blood counts are stable      David Bull MD   01/18/2025     All diagnosis and differential diagnosis have been reviewed; assessment and plan has been documented; I have personally reviewed the labs and test results that are presently available; I have reviewed the patients medication list; I have reviewed the consulting providers response and recommendations. I have reviewed or attempted to review medical records based upon their availability    All of the patient's questions have been  addressed and answered. Patient's is agreeable to the above stated plan. I will continue to monitor closely and make adjustments to medical management as needed.  _____________________________________________________________________

## 2025-01-18 NOTE — PROGRESS NOTES
Nephrology consult follow up note    HPI:      Mildred Atkins is a 49 y.o. male Who presents with left leg pain and swelling. PMH significant for ESRD on HD TTS at [facility], hypertension, coronary artery disease, anemia, and secondary hyperparathyroidism. Patient presented as a transfer from outside hospital due to left leg pain and swelling. He has been having left leg pain and swelling for about 2 weeks. After admission he has started on antibiotics for presumed sepsis. Patient also has severe leukocytosis. Nephrology consulted for ESRD management.  Workup for left thigh swelling concerning for malignant sarcoma.    Interval history:     01/14/2025   He did have MRI done today.  Results are pending.  No new complaints.  Waiting to go for dialysis today.  No chest pain no abdominal pains.  No shortness of breath.  Patient examined again at 2:00 p.m. while on dialysis.     01/15/2025   MRI results noted.  It looks like he will be going to ortho oncology in [place] or [facility].  Sitting up in the bed and has no new complaints.    01/16/2025  No acute events overnight.  No new complaints.  Continues to have left thigh swelling.  No chest pain, shortness of breath, nausea vomiting.    01/17/2025  No acute events overnight.  No new complaints.  Hemoglobin improved with PRBC transfusion yesterday.  Tolerated dialysis yesterday without problem.  No chest pain, shortness of breath, nausea, vomiting.    01/18/2025  No acute events overnight.  Still endorsing left leg pain.  No chest pain, shortness of breath, nausea, vomiting, or lower extremity edema..     Review of Systems:     Comprehensive 10pt ROS negative except as noted per history.    Past medical, family, surgical, and social history reviewed and unchanged from initial consult note.     Objective:       VITAL SIGNS: 24 HR MIN & MAX LAST    Temp  Min: 97.4 °F (36.3 °C)  Max: 99.2 °F (37.3 °C)  98.9 °F (37.2 °C)        BP  Min: 101/64  Max: 156/83   129/70     Pulse  Min: 97  Max: 115  110     Resp  Min: 16  Max: 20  20    SpO2  Min: 97 %  Max: 100 %  100 %      GEN:  Well-appearing AA male  CV: RRR +S1,S2 without murmur  PULM: CTAB, unlabored  ABD: Soft, NT/ND abdomen with NABS  EXT: No cyanosis or edema.  Swelling of left thigh  SKIN: Warm and dry  PSYCH: Awake, alert and appropriately conversant.   Dialysis access: RUE AVF             Component Value Date/Time     (L) 01/18/2025 0417     (L) 01/17/2025 0344     09/24/2021 1425     08/20/2021 0646    K 4.7 01/18/2025 0417    K 4.5 01/17/2025 0344    K 3.4 (L) 09/24/2021 1425    K 3.3 (L) 08/20/2021 0646    CO2 17 (L) 01/18/2025 0417    CO2 20 (L) 01/17/2025 0344    CO2 34 (H) 09/24/2021 1425    CO2 31 08/20/2021 0646    BUN 70.4 (H) 01/18/2025 0417    BUN 51.2 (H) 01/17/2025 0344    BUN 44 (H) 09/24/2021 1425    BUN 45 (H) 08/20/2021 0646    CREATININE 7.85 (H) 01/18/2025 0417    CREATININE 6.41 (H) 01/17/2025 0344    CREATININE 6.22 (H) 09/24/2021 1425    CREATININE 6.81 (H) 08/20/2021 0646    CALCIUM 9.2 01/18/2025 0417    CALCIUM 9.6 01/17/2025 0344    CALCIUM 9.7 05/21/2024 0000    CALCIUM 9.5 09/24/2021 1425    PHOS 5.8 (H) 01/16/2025 0358            Component Value Date/Time    WBC 41.94 (H) 01/18/2025 0417    WBC 38.62 (H) 01/17/2025 0344    WBC 38.62 01/17/2025 0344    WBC 46.88 01/13/2025 0023    HGB 8.3 (L) 01/18/2025 0417    HGB 8.5 (L) 01/17/2025 0344    HGB 11.2 (L) 12/30/2024 0000    HGB 11.6 (L) 12/23/2024 0000    HCT 25.5 (L) 01/18/2025 0417    HCT 25.7 (L) 01/17/2025 0344     01/18/2025 0417     01/17/2025 0344         Imaging reviewed      Assessment / Plan:       Active Hospital Problems    Diagnosis  POA    *Left leg pain [M79.605]  Unknown    Sepsis [A41.9]  Unknown      Resolved Hospital Problems   No resolved problems to display.       ESRD - normally [place] [facility]  Anemia chronic disease  Hypertension  Left thigh complex lesion, concerning for  malignancy     Plan:  Plan for hemodialysis today on regular TTS schedule.  He is okay to discharge from Nephrology standpoint whenever ready.    Darron Green DO  Nephrology  [facility]  Clinic number: [phone]      Note was created with the assistance of electronic Dictation Services.  Note was reviewed to decrease errors, however, these may still be present.  Please contact me about questions or concerns with the dictation.

## 2025-01-19 VITALS
WEIGHT: 198.44 LBS | TEMPERATURE: 98 F | SYSTOLIC BLOOD PRESSURE: 125 MMHG | DIASTOLIC BLOOD PRESSURE: 64 MMHG | BODY MASS INDEX: 27.78 KG/M2 | OXYGEN SATURATION: 100 % | HEART RATE: 115 BPM | RESPIRATION RATE: 20 BRPM | HEIGHT: 71 IN

## 2025-01-19 LAB
ABS NEUT (OLG): 23.98 X10(3)/MCL (ref 2.1–9.2)
ANION GAP SERPL CALC-SCNC: 18 MEQ/L
ANISOCYTOSIS BLD QL SMEAR: ABNORMAL
BACTERIA BLD CULT: NORMAL
BACTERIA BLD CULT: NORMAL
BUN SERPL-MCNC: 65 MG/DL (ref 8.9–20.6)
CALCIUM SERPL-MCNC: 9.1 MG/DL (ref 8.4–10.2)
CHLORIDE SERPL-SCNC: 93 MMOL/L (ref 98–107)
CO2 SERPL-SCNC: 23 MMOL/L (ref 22–29)
CREAT SERPL-MCNC: 7.17 MG/DL (ref 0.72–1.25)
CREAT/UREA NIT SERPL: 9
EOSINOPHIL NFR BLD MANUAL: 0.59 X10(3)/MCL (ref 0–0.9)
EOSINOPHIL NFR BLD MANUAL: 2 %
ERYTHROCYTE [DISTWIDTH] IN BLOOD BY AUTOMATED COUNT: 16.5 % (ref 11.5–17)
GFR SERPLBLD CREATININE-BSD FMLA CKD-EPI: 9 ML/MIN/1.73/M2
GLUCOSE SERPL-MCNC: 110 MG/DL (ref 74–100)
HCT VFR BLD AUTO: 23.1 % (ref 42–52)
HGB BLD-MCNC: 7.7 G/DL (ref 14–18)
INSTRUMENT WBC (OLG): 29.61 X10(3)/MCL
LYMPHOCYTES NFR BLD MANUAL: 1.18 X10(3)/MCL
LYMPHOCYTES NFR BLD MANUAL: 4 %
MCH RBC QN AUTO: 32.9 PG (ref 27–31)
MCHC RBC AUTO-ENTMCNC: 33.3 G/DL (ref 33–36)
MCV RBC AUTO: 98.7 FL (ref 80–94)
MONOCYTES NFR BLD MANUAL: 13 %
MONOCYTES NFR BLD MANUAL: 3.85 X10(3)/MCL (ref 0.1–1.3)
NEUTROPHILS NFR BLD MANUAL: 81 %
NRBC BLD AUTO-RTO: 0 %
PLATELET # BLD AUTO: 372 X10(3)/MCL (ref 130–400)
PLATELET # BLD EST: NORMAL 10*3/UL
PMV BLD AUTO: 9.6 FL (ref 7.4–10.4)
POIKILOCYTOSIS BLD QL SMEAR: ABNORMAL
POTASSIUM SERPL-SCNC: 4.1 MMOL/L (ref 3.5–5.1)
RBC # BLD AUTO: 2.34 X10(6)/MCL (ref 4.7–6.1)
RBC MORPH BLD: ABNORMAL
SODIUM SERPL-SCNC: 134 MMOL/L (ref 136–145)
STOMATOCYTES (OLG): ABNORMAL
WBC # BLD AUTO: 29.61 X10(3)/MCL (ref 4.5–11.5)

## 2025-01-19 PROCEDURE — 63600175 PHARM REV CODE 636 W HCPCS: Performed by: INTERNAL MEDICINE

## 2025-01-19 PROCEDURE — 63600175 PHARM REV CODE 636 W HCPCS: Performed by: NURSE PRACTITIONER

## 2025-01-19 PROCEDURE — 25000003 PHARM REV CODE 250: Performed by: STUDENT IN AN ORGANIZED HEALTH CARE EDUCATION/TRAINING PROGRAM

## 2025-01-19 PROCEDURE — 25000003 PHARM REV CODE 250

## 2025-01-19 PROCEDURE — 80048 BASIC METABOLIC PNL TOTAL CA: CPT | Performed by: HOSPITALIST

## 2025-01-19 PROCEDURE — 63600175 PHARM REV CODE 636 W HCPCS: Performed by: STUDENT IN AN ORGANIZED HEALTH CARE EDUCATION/TRAINING PROGRAM

## 2025-01-19 PROCEDURE — 85025 COMPLETE CBC W/AUTO DIFF WBC: CPT | Performed by: HOSPITALIST

## 2025-01-19 PROCEDURE — 25000003 PHARM REV CODE 250: Performed by: NURSE PRACTITIONER

## 2025-01-19 PROCEDURE — 36415 COLL VENOUS BLD VENIPUNCTURE: CPT | Performed by: HOSPITALIST

## 2025-01-19 PROCEDURE — 25000003 PHARM REV CODE 250: Performed by: INTERNAL MEDICINE

## 2025-01-19 RX ORDER — HYDROCODONE BITARTRATE AND ACETAMINOPHEN 10; 325 MG/1; MG/1
1 TABLET ORAL EVERY 6 HOURS PRN
Qty: 28 TABLET | Refills: 0 | Status: SHIPPED | OUTPATIENT
Start: 2025-01-19 | End: 2025-01-26

## 2025-01-19 RX ORDER — FERROUS SULFATE 325(65) MG
325 TABLET ORAL 2 TIMES DAILY
Qty: 60 TABLET | Refills: 0 | Status: SHIPPED | OUTPATIENT
Start: 2025-01-19 | End: 2025-02-18

## 2025-01-19 RX ADMIN — ASPIRIN 81 MG: 81 TABLET, COATED ORAL at 08:01

## 2025-01-19 RX ADMIN — CARVEDILOL 12.5 MG: 12.5 TABLET, FILM COATED ORAL at 08:01

## 2025-01-19 RX ADMIN — CLONIDINE HYDROCHLORIDE 0.1 MG: 0.1 TABLET ORAL at 08:01

## 2025-01-19 RX ADMIN — MORPHINE SULFATE 4 MG: 4 INJECTION INTRAVENOUS at 05:01

## 2025-01-19 RX ADMIN — NIFEDIPINE 90 MG: 90 TABLET, FILM COATED, EXTENDED RELEASE ORAL at 08:01

## 2025-01-19 RX ADMIN — CINACALCET HYDROCHLORIDE 90 MG: 30 TABLET, FILM COATED ORAL at 08:01

## 2025-01-19 RX ADMIN — Medication 6 MG: at 02:01

## 2025-01-19 RX ADMIN — SODIUM CHLORIDE 125 MG: 9 INJECTION, SOLUTION INTRAVENOUS at 08:01

## 2025-01-19 RX ADMIN — HYDROCODONE BITARTRATE AND ACETAMINOPHEN 1 TABLET: 10; 325 TABLET ORAL at 08:01

## 2025-01-19 RX ADMIN — HYDROCODONE BITARTRATE AND ACETAMINOPHEN 1 TABLET: 10; 325 TABLET ORAL at 02:01

## 2025-01-19 NOTE — DISCHARGE SUMMARY
[facility] Hospital Medicine Discharge Summary    Admit Date: 1/12/2025  Discharge Date and Time: 1/19/20257:44 AM  Admitting Physician: Hospitalist team   Discharging Physician: David Bull MD.  Primary Care Physician: Sharmila, Primary Doctor      Discharge Diagnoses:  Left thigh sarcoma   Malignancy related leukocytosis  NSTEMI- type 2   Anemia of chronic disease   HTN- urgency  ESRD with HD       Hospital Course:   49 y.o. male who PMH Includes HTN, CAD, ESRD with HD, anemia, hyperparathyroidism; presents to the ED at [facility] on 1/12/2025 transferred from [facility] for higher level of care and further evaluation of left leg pain complaints.  Patient reports he has been having left leg pain off and on over the past  2 week progressively getting worse. Pt reports he started to have swelling which started in his left groin/thigh area.  Patient reports he was seen at [facility] ED for this with need to follow-up with his PCP.  Patient reports the pain and swelling worsened thus he presented to [facility] ED again.  X-ray of the left femur impression reviewed from outside hospital demonstrated no evidence of acute fracture dislocation.  Of the left lower extremity done at outside hospital impression reviewed demonstrated extensive soft tissue edema noted, heterogeneous mass versus hematoma noted involving the anterior quadriceps muscle, recommend MRI for further evaluation.  Chest x-ray done at outside hospital impression reviewed demonstrated no evidence of acute cardiopulmonary process radiographically.    Initial vital signs /92 pulse 136 respirations 20 temperature 102.2° F O2 saturation 98% on room air.  Patient did have blood cultures collected at OSH.  Patient was started on IV vancomycin and Zosyn therapy.  At the time of rounds patient's heart rate had trended down to 110's. Pt denies any CP, SOB, N/V/D.      Patient was started on broad spectrum antibiotics. HD started  "per renal team. Patients h&h dropped and he was transfused 1 unit of PRBC. Since the swelling was firm and non mobile there was a high suspicion for an infected hematoma vs a sarcoma. 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 Seen by ortho team and recommended transfer to hospital with ortho oncology service. CM notified.  Hospitalist group was able to speak with orthopedic oncology in [place].  The recommended getting the biopsy done here as it will take 2-4 weeks to returned.  Unfortunately we do not have IR services available for the next couple weeks.  It was set up as an outpatient on 01/22 at 1:00 p.m. in [place] with Dr. Kyle's office.  Case management was also able to verify patient has transportation benefits.  He needs to call at least 24 hours before the appointment.  Patient tells me that on day of discharge that his wife has already set up arrangements for transportation on Wednesday.  Nephrology has cleared him from their standpoint from discharge.  Hemoglobin remained stable.  Will discharge with oral analgesia and continued iron supplementation.  Follow up with biopsy later this week as discussed.       Vitals:  Blood pressure 125/64, pulse 110, temperature 97.8 °F (36.6 °C), temperature source Oral, resp. rate 20, height 5' 11" (1.803 m), weight 90 kg (198 lb 6.6 oz), SpO2 100%..    Physical Exam:  Awake, Alert, Oriented x 3, No new focal Neurologic deficit, Normal Affect  NC/AT, PERRLA, EOMI  Supple neck, no JVD, No cervical lymphadenopathy  Symmetrical chest, Good air entry bilaterally. No rhonchi, wheezes, crackles appreciated  RRR, No gallop, rub or murmur  +ve Bowel sounds x4, Abd soft and non tender, no rebound, guarding or rigidity  No Cyanosis, cludding or edema, No new rash or bruises    Procedures Performed: No admission procedures for hospital encounter.     Significant Diagnostic Studies: See Full reports for all details  No results displayed because visit has " over 200 results.           Microbiology Results (last 7 days)       Procedure Component Value Units Date/Time    Blood Culture [[id number]]  (Normal) Collected: 01/14/25 1752    Order Status: Completed Specimen: Blood Updated: 01/18/25 2000     Blood Culture No Growth At 96 Hours    Blood Culture [[id number]]  (Normal) Collected: 01/14/25 1752    Order Status: Completed Specimen: Blood Updated: 01/18/25 2000     Blood Culture No Growth At 96 Hours             Echo    Result Date: 1/17/2025    Left Ventricle: The left ventricle is moderately dilated. Severely increased wall thickness. Global hypokinesis present. There is moderately reduced systolic function with a visually estimated ejection fraction of 35 - 40%.   Right Ventricle: Normal right ventricular cavity size. Systolic function is normal.   Aortic Valve: There is mild aortic valve sclerosis. There is normal leaflet mobility. There is no stenosis. Aortic valve peak velocity is 1.9 m/s. Mean gradient is 7 mmHg. There is no significant regurgitation.   Mitral Valve: Mildly thickened leaflets. There is no significant regurgitation.   Tricuspid Valve: There is mild regurgitation.   IVC/SVC: Normal venous pressure at 3 mmHg.   Pericardium: There is a small circumferential effusion. No indication of cardiac tamponade. Evidence includes no IVC dilation, no chamber collapse, no respiratory chamber variation.     CT Chest Abdomen Pelvis With IV Contrast (XPD) NO Oral Contrast    Result Date: 1/16/2025  EXAMINATION: CT CHEST ABDOMEN PELVIS WITH IV CONTRAST (XPD) CLINICAL HISTORY: R/O mets; TECHNIQUE: Low dose axial images, sagittal and coronal reformations were obtained from the thoracic inlet to the pubic symphysis following the IV and oral contrast administration.  Automatic exposure control is utilized to reduce patient radiation exposure. COMPARISON: None available FINDINGS: The lungs are adequately aerated.  There is a cavitary lesion seen in the apex of the  right lower lobe.  It measures 2.2 x 2.1 cm.  No other lung lesion is seen..  No pleural thickening is seen.  No pleural effusion is seen.  No infiltrate is seen. The thoracic aorta is normal in caliber.. No mediastinal adenopathy is seen. The heart appears normal in size.. The liver appears normal.  No liver mass or lesion is seen.  Portal and hepatic veins appear normal.. Gallbladder is contracted.  There is a questionable small punctate gallstone in gallbladder. There is evidence of anasarca edema and mesenteric edema. The spleen appears normal.  No splenic mass or lesion is seen. There is evidence of pancreatic duct dilatation seen.  Pancreas is poorly visualized due to the mesenteric edema.  Pancreatic head is poorly visualized.  This is due to mistiming of the contrast bolus as well as the diffuse mesenteric edema. Atherosclerotic plaque is seen throughout the abdominal aorta and mesenteric vessels.. No adrenal abnormality is seen.  No adrenal nodule is seen. The kidneys are well perfused.  No hydronephrosis is seen.  No hydroureter is seen.  No retroperitoneal abnormality is seen.. Visualized portions of the bowel shows no acute abnormality.  No colitis is seen.  No diverticulitis is seen.  No colonic mass is seen. No free air is seen.  No free fluid is seen. Urinary bladder appears unremarkable. There is a large soft tissue mass seen in the anterior thigh on the left side consistent with patient's history of possible sarcoma.  Diffuse left leg edema is seen.     Cavitary lesion in the apex of the right lower lobe.  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  There is dilatation of the pancreatic  duct.  No obvious pancreatic head mass is seen however additional imaging may be beneficial if clinical suspicion remains high Electronically signed by: Dallin Lew Date:    01/16/2025 Time:    11:34    X-Ray Femur 2 View Left    Result Date: 1/15/2025  EXAMINATION XR FEMUR 2 VIEW LEFT CLINICAL HISTORY Swelling; TECHNIQUE A total of 4 images submitted of the left femur. COMPARISON None available at the time of initial interpretation. FINDINGS No displaced fracture or dislocation is identified. The visualized joint spaces are preserved. No aggressive osseous lesion or periosteal reaction is evident. No convincing acute soft tissue abnormality is identified.  There is widespread regional vascular calcification. IMPRESSION 1. No convincing acute abnormality. 2. Widespread vascular calcification. Electronically signed by: Sanju Bynum Date:    01/15/2025 Time:    18:30    MRI Femur W WO Contrast Left    Result Date: 1/14/2025  EXAMINATION: MRI FEMUR W WO CONTRAST LEFT CLINICAL HISTORY: Soft tissue mass, thigh, deep;Patient on HD,  please coordinate with dialysis; TECHNIQUE: Enhanced and unenhanced, multiplanar, multisequence MR imaging of the left thigh was obtained COMPARISON: None FINDINGS: The patient's bone marrow is poorly demonstrated on today's study due to extensive artifact but does appear markedly abnormal.  On the T1 weighted images, bone marrow is hypointense to the proximal femur and throughout the included portion of the pelvis.  The cortices appear thickened but this is not well demonstrated on today's study either. A 10 cm x 8 cm by 9 cm complex mass is present to the anterior portion of the proximal thigh and primarily involves the rectus femoris and vastus intermedius muscles.  This demonstrates thick nodular peripheral enhancement and a cystic/necrotic central component.  The margin is aggressive involving the quadriceps and adductor muscles. A separate fat containing mass is partially visualized to  the medial aspect of the distal thigh and measures at least 8 cm by 5 cm by 3 cm in size.  This demonstrates thin enhancing internal septations and is present just anterior to the distal portion of the semimembranosus muscle.  Marked subcutaneous edema is present to the entire thigh.  The neurovascular structures are poorly demonstrated on today's exam and evaluation is somewhat limited.     A 10 cm x 8 cm by 9 cm complex mass is present to the anterior portion of the proximal thigh and primarily involves the rectus femoris and vastus intermedius muscles. This demonstrates thick nodular peripheral enhancement and a cystic/necrotic central component.  The margin is aggressive involving the quadriceps and adductor muscles.  Primary differential considerations at this point include an aggressive appearing intramuscular abscess with infectious myositis involving the muscles of the anterior thigh and an aggressive soft tissue malignancy which demonstrates central necrosis.  Please correlate with the patient's history as to the need for additional imaging and or referral to Infectious Disease or orthopedic oncology. A separate fat containing mass is partially visualized to the medial aspect of the distal thigh and measures at least 8 cm by 5 cm by 3 cm in size. This demonstrates thin enhancing internal septations and is present just anterior to the distal portion of the semimembranosus muscle.  The features typically associated with liposarcoma are not well demonstrated on today's study however the majority the lesion is not included in the field of view and may be further evaluated with MRI of the distal thigh/knee if additional imaging is indicated. The bone marrow is poorly demonstrated on today's study but appears abnormal.  This may be further evaluated with bone scan, CT scan, or both depending on the patient's medical history. Electronically signed by: Serjio Hutchison Date:    01/14/2025 Time:    16:12  - pulls last  radiology orders        Medication List        START taking these medications      ferrous sulfate 325 mg (65 mg iron) Tab tablet  Commonly known as: FEOSOL  Take 1 tablet (325 mg total) by mouth 2 (two) times daily.     HYDROcodone-acetaminophen  mg per tablet  Commonly known as: NORCO  Take 1 tablet by mouth every 6 (six) hours as needed for Pain.            CONTINUE taking these medications      aspirin 81 MG EC tablet  Commonly known as: ECOTRIN     atorvastatin 40 MG tablet  Commonly known as: LIPITOR     carvediloL 12.5 MG tablet  Commonly known as: COREG     cinacalcet 90 MG Tab  Commonly known as: SENSIPAR     cloNIDine 0.1 MG tablet  Commonly known as: CATAPRES     NIFEdipine 90 MG Tbsr  Commonly known as: ADALAT CC     VELPHORO 500 mg Chew  Generic drug: sucroferric oxyhydroxide            STOP taking these medications      benzonatate 200 MG capsule  Commonly known as: TESSALON     chlorproMAZINE 50 MG tablet  Commonly known as: THORAZINE               Where to Get Your Medications        These medications were sent to [facility] [address] [place] [address]  [facility] [address] [address]      Phone: [phone]   ferrous sulfate 325 mg (65 mg iron) Tab tablet  HYDROcodone-acetaminophen  mg per tablet          Explained in detail to the patient about the discharge plan, medications, and follow-up visits. Pt understands and agrees with the treatment plan  Discharged Condition: stable  Diet: renal on HD  Disposition: home    Medications Per DC med rec  Activities as tolerated  Follow up with your PCP in 2 wks   For further questions contact hospitalist office    Discharge time 33 minutes    For worsening symptoms, chest pain, shortness of breath, increased abdominal pain, high grade fever, stroke or stroke like symptoms, immediately go to the nearest Emergency Room or call 911 as soon as possible.        David Simmons M.D on 1/19/2025. at 7:44 AM.

## 2025-01-19 NOTE — PLAN OF CARE
Problem: Adult Inpatient Plan of Care  Goal: Absence of Hospital-Acquired Illness or Injury  Outcome: Progressing     Problem: Hemodialysis  Goal: Effective Tissue Perfusion  Outcome: Progressing     Problem: Pain Acute  Goal: Optimal Pain Control and Function  Outcome: Progressing

## 2025-01-19 NOTE — NURSING
01/18/25 1801   Post-Hemodialysis Assessment   Rinseback Volume (mL) 500 mL   Blood Volume Processed (Liters) 72 L   Dialyzer Clearance Lightly streaked   Duration of Treatment 180 minutes   Total UF (mL) 3500 mL   Patient Response to Treatment Pt tolerated HD well   Post-Hemodialysis Comments HD x 3 hours, net UF 3L, pt tolerated HD well

## 2025-01-19 NOTE — PLAN OF CARE
01/19/25 0935   Final Note   Assessment Type Final Discharge Note   Anticipated Discharge Disposition Home   Post-Acute Status   Discharge Delays None known at this time

## 2025-01-23 ENCOUNTER — PATIENT OUTREACH (OUTPATIENT)
Dept: ADMINISTRATIVE | Facility: CLINIC | Age: 50
End: 2025-01-23
Payer: MEDICARE

## 2025-01-23 NOTE — PROGRESS NOTES
C3 nurse attempted to contact Mildred Atkins for a TCC post hospital discharge follow up call. No answer. Left voicemail with callback information. The patient has a scheduled HOSFU appointment with Eduard Ward MD (Orthopedic Surgery); at [facility] on 1/22/2025 @ 1:00pm.

## 2025-01-24 NOTE — PROGRESS NOTES
3rd attempt-C3 nurse attempted to contact Tremaineon SOFIA Wilbert for a TCC post hospital discharge follow up call. No answer. Unable to leave voicemail. The patient has a scheduled HOSFU appointment with Eduard Ward MD (Orthopedic Surgery); at [facility] on 1/22/2025 @ 1:00pm.

## 2025-01-24 NOTE — PROGRESS NOTES
2nd attempt-C3 nurse attempted to contact Tremaineon SOFIA Wilbert for a TCC post hospital discharge follow up call. No answer. Unable to leave voicemail. The patient has a scheduled HOSFU appointment with Eduard Ward MD (Orthopedic Surgery); at [facility] on 1/22/2025 @ 1:00pm.